# Patient Record
Sex: FEMALE | Race: BLACK OR AFRICAN AMERICAN | Employment: FULL TIME | URBAN - METROPOLITAN AREA
[De-identification: names, ages, dates, MRNs, and addresses within clinical notes are randomized per-mention and may not be internally consistent; named-entity substitution may affect disease eponyms.]

---

## 2019-04-28 ENCOUNTER — APPOINTMENT (OUTPATIENT)
Dept: GENERAL RADIOLOGY | Age: 24
End: 2019-04-28

## 2019-04-28 ENCOUNTER — HOSPITAL ENCOUNTER (EMERGENCY)
Age: 24
Discharge: HOME | End: 2019-04-28
Attending: EMERGENCY MEDICINE

## 2019-04-28 VITALS
WEIGHT: 285 LBS | DIASTOLIC BLOOD PRESSURE: 84 MMHG | RESPIRATION RATE: 17 BRPM | BODY MASS INDEX: 50.5 KG/M2 | HEART RATE: 98 BPM | TEMPERATURE: 99.1 F | HEIGHT: 63 IN | OXYGEN SATURATION: 98 % | SYSTOLIC BLOOD PRESSURE: 130 MMHG

## 2019-04-28 DIAGNOSIS — J02.9 VIRAL PHARYNGITIS: Primary | ICD-10-CM

## 2019-04-28 DIAGNOSIS — J45.21 MILD INTERMITTENT ASTHMA WITH EXACERBATION: ICD-10-CM

## 2019-04-28 PROCEDURE — 99283 EMERGENCY DEPT VISIT LOW MDM: CPT

## 2019-04-28 PROCEDURE — 96366 THER/PROPH/DIAG IV INF ADDON: CPT

## 2019-04-28 PROCEDURE — 6370000000 HC RX 637 (ALT 250 FOR IP): Performed by: EMERGENCY MEDICINE

## 2019-04-28 PROCEDURE — 2580000003 HC RX 258: Performed by: EMERGENCY MEDICINE

## 2019-04-28 PROCEDURE — 94664 DEMO&/EVAL PT USE INHALER: CPT

## 2019-04-28 PROCEDURE — 96365 THER/PROPH/DIAG IV INF INIT: CPT

## 2019-04-28 PROCEDURE — 96375 TX/PRO/DX INJ NEW DRUG ADDON: CPT

## 2019-04-28 PROCEDURE — 94640 AIRWAY INHALATION TREATMENT: CPT

## 2019-04-28 PROCEDURE — 71046 X-RAY EXAM CHEST 2 VIEWS: CPT

## 2019-04-28 PROCEDURE — 6360000002 HC RX W HCPCS: Performed by: EMERGENCY MEDICINE

## 2019-04-28 PROCEDURE — 96372 THER/PROPH/DIAG INJ SC/IM: CPT

## 2019-04-28 RX ORDER — SODIUM CHLORIDE, SODIUM LACTATE, POTASSIUM CHLORIDE, AND CALCIUM CHLORIDE .6; .31; .03; .02 G/100ML; G/100ML; G/100ML; G/100ML
1000 INJECTION, SOLUTION INTRAVENOUS ONCE
Status: COMPLETED | OUTPATIENT
Start: 2019-04-28 | End: 2019-04-28

## 2019-04-28 RX ORDER — PREDNISONE 20 MG/1
40 TABLET ORAL ONCE
Status: COMPLETED | OUTPATIENT
Start: 2019-04-28 | End: 2019-04-28

## 2019-04-28 RX ORDER — DEXAMETHASONE SODIUM PHOSPHATE 4 MG/ML
10 INJECTION, SOLUTION INTRA-ARTICULAR; INTRALESIONAL; INTRAMUSCULAR; INTRAVENOUS; SOFT TISSUE ONCE
Status: COMPLETED | OUTPATIENT
Start: 2019-04-28 | End: 2019-04-28

## 2019-04-28 RX ORDER — KETOROLAC TROMETHAMINE 30 MG/ML
15 INJECTION, SOLUTION INTRAMUSCULAR; INTRAVENOUS ONCE
Status: COMPLETED | OUTPATIENT
Start: 2019-04-28 | End: 2019-04-28

## 2019-04-28 RX ORDER — IPRATROPIUM BROMIDE AND ALBUTEROL SULFATE 2.5; .5 MG/3ML; MG/3ML
1 SOLUTION RESPIRATORY (INHALATION) ONCE
Status: COMPLETED | OUTPATIENT
Start: 2019-04-28 | End: 2019-04-28

## 2019-04-28 RX ORDER — ALBUTEROL SULFATE 90 UG/1
2 AEROSOL, METERED RESPIRATORY (INHALATION) 4 TIMES DAILY PRN
Qty: 1 INHALER | Refills: 0 | Status: SHIPPED | OUTPATIENT
Start: 2019-04-28 | End: 2020-07-07

## 2019-04-28 RX ORDER — ONDANSETRON 2 MG/ML
8 INJECTION INTRAMUSCULAR; INTRAVENOUS ONCE
Status: COMPLETED | OUTPATIENT
Start: 2019-04-28 | End: 2019-04-28

## 2019-04-28 RX ORDER — IBUPROFEN 600 MG/1
600 TABLET ORAL EVERY 6 HOURS PRN
Qty: 30 TABLET | Refills: 0 | Status: SHIPPED | OUTPATIENT
Start: 2019-04-28 | End: 2020-07-07

## 2019-04-28 RX ORDER — PREDNISONE 10 MG/1
TABLET ORAL
Qty: 16 TABLET | Refills: 0 | Status: SHIPPED | OUTPATIENT
Start: 2019-04-28 | End: 2019-05-08

## 2019-04-28 RX ADMIN — DEXAMETHASONE SODIUM PHOSPHATE 10 MG: 4 INJECTION, SOLUTION INTRAMUSCULAR; INTRAVENOUS at 16:53

## 2019-04-28 RX ADMIN — ONDANSETRON 8 MG: 2 INJECTION INTRAMUSCULAR; INTRAVENOUS at 16:52

## 2019-04-28 RX ADMIN — IPRATROPIUM BROMIDE AND ALBUTEROL SULFATE 1 AMPULE: .5; 3 SOLUTION RESPIRATORY (INHALATION) at 16:33

## 2019-04-28 RX ADMIN — KETOROLAC TROMETHAMINE 15 MG: 30 INJECTION, SOLUTION INTRAMUSCULAR at 16:53

## 2019-04-28 RX ADMIN — PREDNISONE 40 MG: 20 TABLET ORAL at 18:31

## 2019-04-28 RX ADMIN — SODIUM CHLORIDE, POTASSIUM CHLORIDE, SODIUM LACTATE AND CALCIUM CHLORIDE 1000 ML: 600; 310; 30; 20 INJECTION, SOLUTION INTRAVENOUS at 16:52

## 2019-04-28 ASSESSMENT — PAIN SCALES - GENERAL
PAINLEVEL_OUTOF10: 7
PAINLEVEL_OUTOF10: 6

## 2019-04-28 ASSESSMENT — PAIN DESCRIPTION - LOCATION: LOCATION: THROAT

## 2019-04-28 ASSESSMENT — PAIN DESCRIPTION - PAIN TYPE: TYPE: ACUTE PAIN

## 2019-04-28 NOTE — ED PROVIDER NOTES
[address] ENCOUNTER          ATTENDING PHYSICIAN NOTE       Date of evaluation: 4/28/2019    Chief Complaint     Cough and Pharyngitis      History of Present Illness     Geronimo Bo is a 26 y.o. female who presents with 2 days of sore throat, cough. Cough is productive of green sputum. She reports associated sinus congestion. Has had one episode of post tussive emesis this morning. Remains nauseated. No diarrhea. Has had subjective fever. No abdominal pain. Has used Afrin without relief. Has not taken anything else for symptoms. Reports that she has asthma but has not had an albuterol inhaler for some time because she never has problems with her asthma. Reports some mild wheezing but no chest pain. Think she is dehydrated because she has not been able to eat or drink much the last few days. No other aggravating or relieving factors or associated symptoms. Review of Systems     Positive for fever, vomiting, shortness of breath. Negative for chest pain, abdominal pain. All other systems were reviewed and are negative, except as mentioned in HPI. Past Medical, Surgical, Family, and Social History     She has a past medical history of Asthma and Bronchitis. She has no past surgical history on file. Her family history is not on file. She reports that she has quit smoking. She has never used smokeless tobacco. She reports that she does not drink alcohol or use drugs. Medications     Previous Medications    No medications on file       Allergies     She has No Known Allergies. Physical Exam     INITIAL VITALS: BP: 130/84, Temp: 99.1 °F (37.3 °C), Pulse: 98, Resp: 17, SpO2: 99 %       General:  Well appearing. No acute distress. Eyes:  Pupils reactive. No discharge from eyes. ENT:  No discharge from nose. OP with tacky mucous membranes and mildly enlarged erythematous tonsils symmetric. Neck:  Supple.   Tender cervical

## 2019-04-28 NOTE — LETTER
[facility] Emergency Department  [address] [place] [address]  Phone: [phone]  Fax: [phone]             April 28, 2019    Patient: Dore Hodgkins   YOB: 1995   Date of Visit: 4/28/2019       To Whom It May Concern:    Dore Hodgkins was seen and treated in our emergency department on 4/28/2019. Please excuse her 4/28/2019 and 4/29/2019.       Sincerely,             Signature:__________________________________

## 2019-04-28 NOTE — ED TRIAGE NOTES
Pt states she's had a sore throat and congestion since Friday. States she has a lot of mucous and has been coughing a lot due to this. States she has coughed to the point of vomiting and has felt chills and intermittent sweating. STates she's been attempting tea without success.

## 2020-03-12 ENCOUNTER — HOSPITAL ENCOUNTER (EMERGENCY)
Age: 25
Discharge: HOME | End: 2020-03-12
Payer: COMMERCIAL

## 2020-03-12 VITALS
HEART RATE: 78 BPM | OXYGEN SATURATION: 98 % | HEIGHT: 63 IN | WEIGHT: 260.36 LBS | SYSTOLIC BLOOD PRESSURE: 154 MMHG | TEMPERATURE: 97.3 F | DIASTOLIC BLOOD PRESSURE: 100 MMHG | BODY MASS INDEX: 46.13 KG/M2 | RESPIRATION RATE: 20 BRPM

## 2020-03-12 PROCEDURE — 6360000002 HC RX W HCPCS: Performed by: PHYSICIAN ASSISTANT

## 2020-03-12 PROCEDURE — 99282 EMERGENCY DEPT VISIT SF MDM: CPT

## 2020-03-12 RX ORDER — DEXAMETHASONE 4 MG/1
8 TABLET ORAL ONCE
Status: COMPLETED | OUTPATIENT
Start: 2020-03-12 | End: 2020-03-12

## 2020-03-12 RX ORDER — AZITHROMYCIN 250 MG/1
TABLET, FILM COATED ORAL
Qty: 1 PACKET | Refills: 0 | Status: SHIPPED | OUTPATIENT
Start: 2020-03-12 | End: 2020-07-07

## 2020-03-12 RX ADMIN — DEXAMETHASONE 8 MG: 4 TABLET ORAL at 02:43

## 2020-03-12 ASSESSMENT — ENCOUNTER SYMPTOMS
VOMITING: 0
VOICE CHANGE: 0
TROUBLE SWALLOWING: 0
SORE THROAT: 1
SHORTNESS OF BREATH: 0

## 2020-03-12 NOTE — ED PROVIDER NOTES
[address] [facility]      Pt Name: Anusha Dejesus  MRN: [id number]  [place] 1995  Date of evaluation: 3/12/2020  Provider: JESUS Harrison    This patient was not seen and evaluated by the attending physician No att. providers found. CHIEF COMPLAINT       Chief Complaint   Patient presents with    Pharyngitis     Patient has sore throat. Patient has been seen at [facility] [place] for issue. Results negative. CRITICAL CARE TIME   I performed a total Critical Care time of  15 minutes, excluding separately reportable procedures. There was a high probability of clinically significant/life threatening deterioration in the patient's condition which required my urgent intervention. Not limited to multiple reexaminations, discussions with attending physician and consultants. HISTORY OF PRESENT ILLNESS  (Location/Symptom, Timing/Onset, Context/Setting, Quality, Duration, Modifying Factors, Severity.)   Anusha Dejesus is a 27 y.o. female who presents to the emergency department accompanied by her dad. She is had a sore throat for about a month. Was seen a couple weeks ago at a hospital and tested negative for strep. She is tried some cough drops and syrup with minimal relief. No drooling or change in voice. Denies chance of pregnancy or known chronic medical problems. Nursing Notes were reviewed and I agree. REVIEW OF SYSTEMS    (2-9 systems for level 4, 10 or more for level 5)     Review of Systems   Constitutional: Negative for fever. HENT: Positive for sore throat. Negative for drooling, trouble swallowing and voice change. Respiratory: Negative for shortness of breath. Cardiovascular: Negative for chest pain. Gastrointestinal: Negative for vomiting. Musculoskeletal: Negative for neck pain and neck stiffness. Skin: Negative for rash and wound. Neurological: Negative for weakness and numbness.    Psychiatric/Behavioral: Negative for agitation and behavioral problems. Except as noted above the remainder of the review of systems was reviewed and negative. PAST MEDICAL HISTORY         Diagnosis Date    Asthma     Bronchitis        SURGICAL HISTORY     History reviewed. No pertinent surgical history. CURRENT MEDICATIONS       Discharge Medication List as of 3/12/2020  2:50 AM      CONTINUE these medications which have NOT CHANGED    Details   albuterol sulfate  (90 Base) MCG/ACT inhaler Inhale 2 puffs into the lungs 4 times daily as needed for Wheezing, Disp-1 Inhaler, R-0Print      ibuprofen (ADVIL;MOTRIN) 600 MG tablet Take 1 tablet by mouth every 6 hours as needed for Pain, Disp-30 tablet, R-0Print             ALLERGIES     Patient has no known allergies. FAMILY HISTORY     History reviewed. No pertinent family history. No family status information on file. SOCIAL HISTORY      reports that she has quit smoking. She has never used smokeless tobacco. She reports current drug use. Drug: Marijuana. She reports that she does not drink alcohol. PHYSICAL EXAM    (up to 7 for level 4, 8 or more for level 5)     ED Triage Vitals [03/12/20 0225]   BP Temp Temp Source Pulse Resp SpO2 Height Weight   (!) 154/100 97.3 °F (36.3 °C) Oral 78 20 98 % 5' 3\" (1.6 m) 260 lb 5.8 oz (118.1 kg)       Physical Exam  Vitals signs and nursing note reviewed. Constitutional:       Appearance: Normal appearance. HENT:      Head: Normocephalic and atraumatic. Mouth/Throat:      Mouth: Mucous membranes are moist.      Pharynx: Oropharyngeal exudate and posterior oropharyngeal erythema present. Neck:      Musculoskeletal: Normal range of motion. Cardiovascular:      Rate and Rhythm: Normal rate. Pulses: Normal pulses. Pulmonary:      Effort: Pulmonary effort is normal. No respiratory distress. Musculoskeletal: Normal range of motion. Skin:     General: Skin is warm.    Neurological:      General: No focal deficit present. Mental Status: She is alert and oriented to person, place, and time. Psychiatric:         Mood and Affect: Mood normal.         Behavior: Behavior normal.         DIAGNOSTIC RESULTS     NONE    LABS:  Labs Reviewed - No data to display    All other labs were within normal range or not returned as of this dictation. EMERGENCY DEPARTMENT COURSE and DIFFERENTIAL DIAGNOSIS/MDM:   Vitals:    Vitals:    03/12/20 0225   BP: (!) 154/100   Pulse: 78   Resp: 20   Temp: 97.3 °F (36.3 °C)   TempSrc: Oral   SpO2: 98%   Weight: 260 lb 5.8 oz (118.1 kg)   Height: 5' 3\" (1.6 m)     I discussed with Carly Hernández and/or family the exam results, diagnosis, care, prognosis, reasons to return and the importance of follow up. Patient and/or family is in full agreement with plan and all questions have been answered. Specific discharge instructions explained, including reasons to return to the emergency department. Carly Hernández is well appearing, non-toxic, and afebrile at the time of discharge. Patient has an exudative pharyngitis. No unilateral swelling, drooling or change in voice. Afebrile not tachycardic. Not hypoxic. Symptoms have been going on for a month which prompted her to come here. She tested negative for strep however with her exudative pharyngitis and persistent symptoms for a month we will cover with antibiotic. Also given a dose of steroid. Her blood pressure is elevated her dad has history of hypertension. Stressed importance of follow-up with primary care to have recheck to return for new, worsening or other concerns. I estimate there is LOW risk for PNEUMONIA, MENINGITIS, PERITONSILLAR ABSCESS, SEPSIS, MALIGNANT OTITIS EXTERNA, OR EPIGLOTTITIS thus I consider the discharge disposition reasonable. CONSULTS:  None    PROCEDURES:  Procedures      FINAL IMPRESSION      1. Exudative pharyngitis    2.  Elevated BP without diagnosis of hypertension          DISPOSITION/PLAN DISPOSITION Decision To Discharge 03/12/2020 02:28:42 AM      PATIENT REFERRED TO:  [facility] [place] ([facility] Pre-Services  [phone]          DISCHARGE MEDICATIONS:  Discharge Medication List as of 3/12/2020  2:50 AM      START taking these medications    Details   azithromycin (ZITHROMAX) 250 MG tablet 2 po day 1, then 1 po days 2-5, Disp-1 packet, R-0Print             (Please note that portions of this note were completed with a voice recognition program.  Efforts were made to edit the dictations but occasionally words are mis-transcribed.)    Leatha Fernandez, [address] [place], [place]  03/12/20 [id number]

## 2020-03-12 NOTE — ED NOTES
Discharge and education instructions reviewed. Patient verbalized understanding, teach-back successful. Patient denied questions at this time. No acute distress noted. Patient instructed to follow-up as noted - return to emergency department if symptoms worsen. Patient verbalized understanding. Discharged per EDMD with discharged instructions.        Migel Baugh RN  03/12/20 [id number]

## 2020-03-12 NOTE — LETTER
[facility] Emergency Department  [address] [facility] [address]  Phone: [phone]               March 15, 2020    Patient: Jordan Suarez   YOB: 1995   Date of Visit: 3/12/2020       To Whom It May Concern:    Jordan Suarez was seen and treated in our emergency department on 3/12/2020. She may return to work on 3/15/2020.       Sincerely,       Will Silverman RN         Signature:__________________________________

## 2020-04-05 ENCOUNTER — HOSPITAL ENCOUNTER (EMERGENCY)
Age: 25
Discharge: HOME | End: 2020-04-05
Payer: COMMERCIAL

## 2020-04-05 VITALS
HEART RATE: 72 BPM | BODY MASS INDEX: 47.07 KG/M2 | OXYGEN SATURATION: 99 % | WEIGHT: 265.65 LBS | SYSTOLIC BLOOD PRESSURE: 121 MMHG | TEMPERATURE: 98 F | DIASTOLIC BLOOD PRESSURE: 71 MMHG | HEIGHT: 63 IN | RESPIRATION RATE: 18 BRPM

## 2020-04-05 PROCEDURE — 6370000000 HC RX 637 (ALT 250 FOR IP): Performed by: NURSE PRACTITIONER

## 2020-04-05 PROCEDURE — 99282 EMERGENCY DEPT VISIT SF MDM: CPT

## 2020-04-05 RX ORDER — PREDNISONE 20 MG/1
60 TABLET ORAL ONCE
Status: COMPLETED | OUTPATIENT
Start: 2020-04-05 | End: 2020-04-05

## 2020-04-05 RX ORDER — PREDNISONE 10 MG/1
60 TABLET ORAL DAILY
Qty: 30 TABLET | Refills: 0 | Status: SHIPPED | OUTPATIENT
Start: 2020-04-05 | End: 2020-04-10

## 2020-04-05 RX ORDER — DIPHENHYDRAMINE HCL 25 MG
25 TABLET ORAL ONCE
Status: COMPLETED | OUTPATIENT
Start: 2020-04-05 | End: 2020-04-05

## 2020-04-05 RX ORDER — DIPHENHYDRAMINE HCL 25 MG
25 CAPSULE ORAL EVERY 4 HOURS PRN
Qty: 25 CAPSULE | Refills: 0 | Status: SHIPPED | OUTPATIENT
Start: 2020-04-05 | End: 2020-04-15

## 2020-04-05 RX ORDER — FAMOTIDINE 20 MG/1
20 TABLET, FILM COATED ORAL ONCE
Status: COMPLETED | OUTPATIENT
Start: 2020-04-05 | End: 2020-04-05

## 2020-04-05 RX ORDER — FAMOTIDINE 20 MG/1
20 TABLET, FILM COATED ORAL 2 TIMES DAILY
Qty: 60 TABLET | Refills: 0 | Status: SHIPPED | OUTPATIENT
Start: 2020-04-05 | End: 2020-07-07

## 2020-04-05 RX ADMIN — FAMOTIDINE 20 MG: 20 TABLET, FILM COATED ORAL at 20:55

## 2020-04-05 RX ADMIN — DIPHENHYDRAMINE HCL 25 MG: 25 TABLET ORAL at 20:55

## 2020-04-05 RX ADMIN — PREDNISONE 60 MG: 20 TABLET ORAL at 20:55

## 2020-04-05 ASSESSMENT — PAIN SCALES - GENERAL
PAINLEVEL_OUTOF10: 0

## 2020-04-05 NOTE — LETTER
[facility] Emergency Department  [address] [facility] [address]  Phone: [phone]               April 7, 2020    Patient: Willian Buenrostro   YOB: 1995   Date of Visit: 4/5/2020       To Whom It May Concern:    Willian Buenrostro was seen and treated in our emergency department on 4/5/2020. She may return to work on 4/7/2020.       Sincerely,               Signature:__________________________________

## 2020-04-06 NOTE — ED PROVIDER NOTES
be negative    PHYSICAL EXAM  /71   Pulse 72   Temp 98 °F (36.7 °C) (Oral)   Resp 18   Ht 5' 3\" (1.6 m)   Wt 265 lb 10.5 oz (120.5 kg)   SpO2 99%   BMI 47.06 kg/m²   GENERAL APPEARANCE: Awake and alert. Cooperative. No acute distress. Vital signs are stable. Afebrile. HEAD: Normocephalic. Atraumatic. EYES: PERRL. EOM's grossly intact. ENT: Mucous membranes are moist.   NECK: Supple. Normal ROM. CHEST: Equal symmetric chest rise. LUNGS: Breathing is unlabored. Speaking comfortably in full sentences. No wheezing, rhonchi, rales, stridor. Abdomen: Nondistended. Soft and nontender. EXTREMITIES: MAEE. No acute deformities. SKIN: Warm and dry. No rash. No acute care. NEUROLOGICAL: Alert and oriented. Strength is 5/5 in all extremities and sensation is intact. ED COURSE/MDM  Patient seen and evaluated. Old records reviewed. Diagnostic testing reviewed and results discussed. I have independently evaluated this patient based upon my scope of practice. Supervising physician was in the department as needed for consultation. Willian Buenrostro presented to the ED today with above noted complaints. She monitored in the emergency department in no acute distress. Patient was given Benadryl, Pepcid, prednisone. Patient directed to return to the emergency department immediately with new or worsening symptoms. Patient also instructed to follow-up with the hazmat team and if they have any additional instructions for her after they get the test results back from the unknown substance to follow their instructions. Patient verbalized understanding.       While in ED patient received   Medications   diphenhydrAMINE (BENADRYL) tablet 25 mg (25 mg Oral Given 4/5/20 2055)   famotidine (PEPCID) tablet 20 mg (20 mg Oral Given 4/5/20 2055)   predniSONE (DELTASONE) tablet 60 mg (60 mg Oral Given 4/5/20 2055)       At this point I do not feel the patient requires further work up and it is reasonable to of these medication. Discharge Medication List as of 4/5/2020 10:02 PM      START taking these medications    Details   diphenhydrAMINE (BENADRYL) 25 MG capsule Take 1 capsule by mouth every 4 hours as needed for Itching, Disp-25 capsule, R-0Print      predniSONE (DELTASONE) 10 MG tablet Take 6 tablets by mouth daily for 5 doses, Disp-30 tablet, R-0Print      famotidine (PEPCID) 20 MG tablet Take 1 tablet by mouth 2 times daily, Disp-60 tablet, R-0Print               FOLLOW UP  [facility]) Pre-Services  [phone]  Schedule an appointment as soon as possible for a visit   follow up    [address] [facility] Emergency Department  [address] [facility]  [phone]  Go to   As needed, If symptoms worsen      DISPOSITION  Patient was discharged to home in good condition. Comment: Please note this report has been produced using speech recognition software and may contain errors related to that system including errors in grammar, punctuation, and spelling, as well as words and phrases that may be inappropriate. If there are any questions or concerns please feel free to contact the dictating provider for clarification.         ASHOK Crow - CNP  04/06/20 8513

## 2020-06-14 ENCOUNTER — HOSPITAL ENCOUNTER (EMERGENCY)
Age: 25
Discharge: HOME | End: 2020-06-14
Attending: EMERGENCY MEDICINE
Payer: COMMERCIAL

## 2020-06-14 ENCOUNTER — APPOINTMENT (OUTPATIENT)
Dept: CT IMAGING | Age: 25
End: 2020-06-14
Payer: COMMERCIAL

## 2020-06-14 ENCOUNTER — APPOINTMENT (OUTPATIENT)
Dept: GENERAL RADIOLOGY | Age: 25
End: 2020-06-14
Payer: COMMERCIAL

## 2020-06-14 VITALS
SYSTOLIC BLOOD PRESSURE: 158 MMHG | TEMPERATURE: 98.1 F | HEIGHT: 62 IN | WEIGHT: 268 LBS | OXYGEN SATURATION: 100 % | HEART RATE: 75 BPM | RESPIRATION RATE: 17 BRPM | BODY MASS INDEX: 49.32 KG/M2 | DIASTOLIC BLOOD PRESSURE: 96 MMHG

## 2020-06-14 PROCEDURE — 70450 CT HEAD/BRAIN W/O DYE: CPT

## 2020-06-14 PROCEDURE — 6360000002 HC RX W HCPCS: Performed by: EMERGENCY MEDICINE

## 2020-06-14 PROCEDURE — 96372 THER/PROPH/DIAG INJ SC/IM: CPT

## 2020-06-14 PROCEDURE — 73630 X-RAY EXAM OF FOOT: CPT

## 2020-06-14 PROCEDURE — 99284 EMERGENCY DEPT VISIT MOD MDM: CPT

## 2020-06-14 PROCEDURE — 6370000000 HC RX 637 (ALT 250 FOR IP): Performed by: EMERGENCY MEDICINE

## 2020-06-14 RX ORDER — ACETAMINOPHEN 500 MG
1000 TABLET ORAL ONCE
Status: COMPLETED | OUTPATIENT
Start: 2020-06-14 | End: 2020-06-14

## 2020-06-14 RX ORDER — KETOROLAC TROMETHAMINE 30 MG/ML
30 INJECTION, SOLUTION INTRAMUSCULAR; INTRAVENOUS ONCE
Status: COMPLETED | OUTPATIENT
Start: 2020-06-14 | End: 2020-06-14

## 2020-06-14 RX ADMIN — KETOROLAC TROMETHAMINE 30 MG: 30 INJECTION, SOLUTION INTRAMUSCULAR; INTRAVENOUS at 14:33

## 2020-06-14 RX ADMIN — ACETAMINOPHEN 1000 MG: 500 TABLET, COATED ORAL at 14:09

## 2020-06-14 ASSESSMENT — PAIN SCALES - GENERAL
PAINLEVEL_OUTOF10: 2
PAINLEVEL_OUTOF10: 2

## 2020-06-14 NOTE — ED PROVIDER NOTES
[address] ENCOUNTER          ATTENDING PHYSICIAN NOTE       Date of evaluation: 6/14/2020    Chief Complaint     Headache and Foot Swelling      History of Present Illness     Judah Mahmood is a 27 y.o. female who presents presenting today with headaches and left foot swelling. The patient reports that she has had several months of intermittent headaches. She states that she works at [employer] and these headaches get worse while she is at work. She reports that they are located in the frontal region. Occasional photophobia. No phonophobia. No nausea or vomiting. No neurologic deficits with the onset of the symptoms. No history of head trauma. These headaches are gradual in onset, lasts approximately 10 to 15 minutes, and then completely resolved on their own. She occasionally takes Tylenol and Excedrin Migraine. She does not have a primary care physician. She also reports some intermittent left foot swelling. Unknown trauma. No calf pain or tenderness. No overlying skin changes or deformity. No numbness or tingling. Review of Systems     Review of Systems  10 point review of systems is negative other than those mentioned above in the HPI    Past Medical, Surgical, Family, and Social History     She has a past medical history of Asthma and Bronchitis. She has no past surgical history on file. Her family history is not on file. She reports that she has quit smoking. She has never used smokeless tobacco. She reports current alcohol use. She reports current drug use. Drug: Marijuana.     Medications     Previous Medications    ALBUTEROL SULFATE  (90 BASE) MCG/ACT INHALER    Inhale 2 puffs into the lungs 4 times daily as needed for Wheezing    AZITHROMYCIN (ZITHROMAX) 250 MG TABLET    2 po day 1, then 1 po days 2-5    FAMOTIDINE (PEPCID) 20 MG TABLET    Take 1 tablet by mouth 2 times daily    IBUPROFEN (ADVIL;MOTRIN) 600 MG TABLET    Take 1 tablet by mouth every 6 medications on file       DISPOSITION  06/14/2020 01:49:36 PM       Bridger Magallon MD  06/14/20 [id number]

## 2020-06-14 NOTE — ED NOTES
Patient prepared for and ready to be discharged. Patient discharged at this time in no acute distress after verbalizing understanding of discharge instructions. Patient left after receiving After Visit Summary instructions.       Syl Mercedes RN  06/14/20 [id number]

## 2020-06-15 ENCOUNTER — TELEPHONE (OUTPATIENT)
Dept: ADMINISTRATIVE | Age: 25
End: 2020-06-15

## 2020-07-07 ENCOUNTER — HOSPITAL ENCOUNTER (OUTPATIENT)
Dept: GENERAL RADIOLOGY | Age: 25
Discharge: HOME | End: 2020-07-07
Payer: COMMERCIAL

## 2020-07-07 ENCOUNTER — HOSPITAL ENCOUNTER (OUTPATIENT)
Age: 25
Discharge: HOME | End: 2020-07-07
Payer: COMMERCIAL

## 2020-07-07 ENCOUNTER — OFFICE VISIT (OUTPATIENT)
Dept: FAMILY MEDICINE CLINIC | Age: 25
End: 2020-07-07
Payer: COMMERCIAL

## 2020-07-07 VITALS
SYSTOLIC BLOOD PRESSURE: 132 MMHG | HEART RATE: 78 BPM | RESPIRATION RATE: 12 BRPM | DIASTOLIC BLOOD PRESSURE: 78 MMHG | WEIGHT: 268.2 LBS | BODY MASS INDEX: 49.05 KG/M2

## 2020-07-07 DIAGNOSIS — E66.01 CLASS 3 SEVERE OBESITY DUE TO EXCESS CALORIES WITH BODY MASS INDEX (BMI) OF 45.0 TO 49.9 IN ADULT, UNSPECIFIED WHETHER SERIOUS COMORBIDITY PRESENT (HCC): ICD-10-CM

## 2020-07-07 DIAGNOSIS — N91.2 AMENORRHEA: ICD-10-CM

## 2020-07-07 PROBLEM — J45.20 MILD INTERMITTENT ASTHMA: Status: ACTIVE | Noted: 2020-07-07

## 2020-07-07 PROBLEM — G43.C0 PERIODIC HEADACHE SYNDROME, NOT INTRACTABLE: Status: ACTIVE | Noted: 2020-07-07

## 2020-07-07 PROBLEM — M79.674 PAIN OF RIGHT GREAT TOE: Status: ACTIVE | Noted: 2020-07-07

## 2020-07-07 PROBLEM — Z23 NEED FOR TDAP VACCINATION: Status: ACTIVE | Noted: 2020-07-07

## 2020-07-07 PROCEDURE — 99204 OFFICE O/P NEW MOD 45 MIN: CPT | Performed by: NURSE PRACTITIONER

## 2020-07-07 PROCEDURE — 73660 X-RAY EXAM OF TOE(S): CPT

## 2020-07-07 PROCEDURE — 90471 IMMUNIZATION ADMIN: CPT | Performed by: NURSE PRACTITIONER

## 2020-07-07 PROCEDURE — G8427 DOCREV CUR MEDS BY ELIG CLIN: HCPCS | Performed by: NURSE PRACTITIONER

## 2020-07-07 PROCEDURE — 90715 TDAP VACCINE 7 YRS/> IM: CPT | Performed by: NURSE PRACTITIONER

## 2020-07-07 PROCEDURE — 1036F TOBACCO NON-USER: CPT | Performed by: NURSE PRACTITIONER

## 2020-07-07 PROCEDURE — G8417 CALC BMI ABV UP PARAM F/U: HCPCS | Performed by: NURSE PRACTITIONER

## 2020-07-07 RX ORDER — ALBUTEROL SULFATE 90 UG/1
2 AEROSOL, METERED RESPIRATORY (INHALATION) 4 TIMES DAILY PRN
Qty: 1 INHALER | Refills: 0 | Status: SHIPPED | OUTPATIENT
Start: 2020-07-07 | End: 2021-10-19 | Stop reason: ALTCHOICE

## 2020-07-07 ASSESSMENT — ENCOUNTER SYMPTOMS
EYE REDNESS: 0
CHEST TIGHTNESS: 0
RHINORRHEA: 0
EYE PAIN: 0
SINUS PAIN: 0
SINUS PRESSURE: 0
WHEEZING: 0
ABDOMINAL PAIN: 0
BACK PAIN: 0
SHORTNESS OF BREATH: 0
STRIDOR: 0
DIARRHEA: 0
COUGH: 0
VOMITING: 0
ABDOMINAL DISTENTION: 0
TROUBLE SWALLOWING: 0
NAUSEA: 0
CONSTIPATION: 0
EYE ITCHING: 0
EYE DISCHARGE: 0

## 2020-07-07 ASSESSMENT — PATIENT HEALTH QUESTIONNAIRE - PHQ9
SUM OF ALL RESPONSES TO PHQ9 QUESTIONS 1 & 2: 0
1. LITTLE INTEREST OR PLEASURE IN DOING THINGS: 0
SUM OF ALL RESPONSES TO PHQ QUESTIONS 1-9: 0
2. FEELING DOWN, DEPRESSED OR HOPELESS: 0
SUM OF ALL RESPONSES TO PHQ QUESTIONS 1-9: 0

## 2020-07-07 NOTE — ASSESSMENT & PLAN NOTE
No fracture noted on x-ray likely a tendon strain when she stubbed it at work. Recommended ice and rest continue to wear hard soled shoes at work this should resolve over the next few weeks.

## 2020-07-07 NOTE — PROGRESS NOTES
2020    Gina Roman (:  1995) is a 23 y.o. female, here to establish care or for evaluation of the following medical concerns:    Patient presents to establish care. She has not seen a doctor with any regularity since her pediatricians. Chief complaint today is headaches. She states it started about 3 months ago during the lockdown and it got significantly worse while she has been at work. 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 She states the headaches come 1-2 times a week and last anywhere from 15 minutes to the entire shift. She states they feel like they are pounding. She takes ibuprofen and Excedrin with some relief at times. She states some of these headaches only go away when she goes to sleep and she wakes up and they are gone. She has mild phonophobia but no photophobia and no nausea with them she denies any neurologic deficit while she has these headaches. 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 She states she feels like she stays well-hydrated but does not get much rest.  Additionally she complains of pain in her right great toe. States that she broke this a number of years ago and tripped while she was at work a couple weeks ago and the toe remains painful. She has not had any imaging done of this. She does wear steel toed shoes while she is at work. She reports amenorrhea has not had a menstrual cycle for 6 months. She is in a same-sex relationship and has no concerns for pregnancy at this time. She is only mildly concerned about the menstrual irregularity as she may want to have children at some point. Review of Systems   Constitutional: Negative for chills, fatigue and fever. HENT: Negative for congestion, ear pain, hearing loss, rhinorrhea, sinus pressure, sinus pain, tinnitus and trouble swallowing. Eyes: Negative for pain, discharge, redness and itching.    Respiratory: Negative for cough, chest tightness, shortness of breath, wheezing and stridor. Cardiovascular: Negative for chest pain, palpitations and leg swelling. Gastrointestinal: Negative for abdominal distention, abdominal pain, constipation, diarrhea, nausea and vomiting. Genitourinary: Negative for difficulty urinating, dysuria, hematuria and urgency. Musculoskeletal: Positive for arthralgias. Negative for back pain, joint swelling, myalgias and neck pain. Right great toe   Skin: Negative for rash and wound. Neurological: Positive for headaches. Negative for dizziness. Current Outpatient Medications   Medication Sig Dispense Refill    albuterol sulfate HFA (VENTOLIN HFA) 108 (90 Base) MCG/ACT inhaler Inhale 2 puffs into the lungs 4 times daily as needed for Wheezing 1 Inhaler 0     No current facility-administered medications for this visit. No Known Allergies    Past Medical History:   Diagnosis Date    Asthma     Bronchitis        No past surgical history on file.     Social History     Socioeconomic History    Marital status: Single     Spouse name: Not on file    Number of children: Not on file    Years of education: Not on file    Highest education level: Not on file   Occupational History    Not on file   Social Needs    Financial resource strain: Not on file    Food insecurity     Worry: Not on file     Inability: Not on file    Transportation needs     Medical: Not on file     Non-medical: Not on file   Tobacco Use    Smoking status: Former Smoker    Smokeless tobacco: Never Used   Substance and Sexual Activity    Alcohol use: Yes     Comment: occ    Drug use: Yes     Types: Marijuana    Sexual activity: Yes   Lifestyle    Physical activity     Days per week: Not on file     Minutes per session: Not on file    Stress: Not on file   Relationships    Social connections     Talks on phone: Not on file     Gets together: Not on file     Attends Druze service: Not on file Active member of club or organization: Not on file     Attends meetings of clubs or organizations: Not on file     Relationship status: Not on file    Intimate partner violence     Fear of current or ex partner: Not on file     Emotionally abused: Not on file     Physically abused: Not on file     Forced sexual activity: Not on file   Other Topics Concern    Not on file   Social History Narrative    Not on file        No family history on file. Vitals:    07/07/20 0904   BP: 132/78   Pulse: 78   Resp: 12       Estimated body mass index is 49.05 kg/m² as calculated from the following:    Height as of 6/14/20: 5' 2\" (1.575 m). Weight as of this encounter: 268 lb 3.2 oz (121.7 kg). Physical Exam  Vitals signs reviewed. Constitutional:       Appearance: She is well-developed. HENT:      Head: Normocephalic and atraumatic. Right Ear: External ear normal.      Left Ear: External ear normal.      Nose: Nose normal.   Eyes:      General: No scleral icterus. Right eye: No discharge. Left eye: No discharge. Conjunctiva/sclera: Conjunctivae normal.      Pupils: Pupils are equal, round, and reactive to light. Neck:      Musculoskeletal: Normal range of motion and neck supple. Thyroid: No thyromegaly. Cardiovascular:      Rate and Rhythm: Normal rate and regular rhythm. Heart sounds: Normal heart sounds. No murmur. No friction rub. No gallop. Pulmonary:      Effort: Pulmonary effort is normal. No respiratory distress. Breath sounds: Normal breath sounds. No stridor. No wheezing or rales. Chest:      Chest wall: No tenderness. Abdominal:      General: Bowel sounds are normal. There is no distension. Palpations: Abdomen is soft. There is no mass. Tenderness: There is no abdominal tenderness. There is no guarding or rebound. Hernia: No hernia is present. Musculoskeletal: Normal range of motion. General: No tenderness or deformity. Lymphadenopathy:      Cervical: No cervical adenopathy. Skin:     General: Skin is warm and dry. Capillary Refill: Capillary refill takes less than 2 seconds. Coloration: Skin is not pale. Findings: No erythema or rash. Neurological:      Mental Status: She is alert and oriented to person, place, and time. Cranial Nerves: No cranial nerve deficit. Motor: No abnormal muscle tone. Coordination: Coordination normal.   Psychiatric:         Behavior: Behavior normal.         Thought Content: Thought content normal.         Judgment: Judgment normal.         ASSESSMENT/PLAN:  Health Maintenance   Topic Date Due    HIV screen  07/01/2012    Chlamydia screen  07/01/2013    Cervical cancer screen  07/01/2018    Flu vaccine (1) 09/01/2020    DTaP/Tdap/Td vaccine (8 - Td) 07/07/2030    Hepatitis B vaccine  Completed    Hib vaccine  Completed    HPV vaccine  Completed    Varicella vaccine  Completed    Meningococcal (ACWY) vaccine  Completed    Hepatitis A vaccine  Aged Out    Pneumococcal 0-64 years Vaccine  Aged Out        Diagnosis Orders   1. Periodic headache syndrome, not intractable     2. Class 3 severe obesity due to excess calories with body mass index (BMI) of 45.0 to 49.9 in adult, unspecified whether serious comorbidity present (HCC)  COMPREHENSIVE METABOLIC PANEL    HEMOGLOBIN A1C    CBC WITH AUTO DIFFERENTIAL    TSH WITH REFLEX TO FT4   3. Mild intermittent asthma, unspecified whether complicated  albuterol sulfate HFA (VENTOLIN HFA) 108 (90 Base) MCG/ACT inhaler   4. Pain of right great toe  XR TOE RIGHT (MIN 2 VIEWS)   5. Amenorrhea  FOLLICLE STIMULATING HORMONE    PROLACTIN   6. Need for Tdap vaccination  Tdap (age 6y and older) IM (BOOSTRIX)     Mild intermittent asthma  Generally well controlled refill provided of albuterol inhaler for rescue as needed    Amenorrhea  Labs today.     Class 3 severe obesity due to excess calories with body mass index (BMI) of 45.0 to 49.9 in adult [facility])  Patient aware of need to lose weight. This may be contributing to her lack of menstrual cycle. Pain of right great toe  No fracture noted on x-ray likely a tendon strain when she stubbed it at work. Recommended ice and rest continue to wear hard soled shoes at work this should resolve over the next few weeks. Periodic headache syndrome, not intractable  Presents the stress versus dehydration headaches rather than chronic migraine. Recommend she take ibuprofen with a large glass of water and some food before reporting to work and drink regularly through the evening while she is working. CT done in the emergency room was negative for mass. Return in about 4 weeks (around 8/4/2020). An  electronic signature was used to authenticate this note.   --Brandi Alcaraz on 7/7/2020 at 11:28 AM

## 2020-07-08 LAB
A/G RATIO: 1.6 (ref 1.1–2.2)
ALBUMIN SERPL-MCNC: 4.6 G/DL (ref 3.4–5)
ALP BLD-CCNC: 63 U/L (ref 40–129)
ALT SERPL-CCNC: 16 U/L (ref 10–40)
ANION GAP SERPL CALCULATED.3IONS-SCNC: 13 MMOL/L (ref 3–16)
AST SERPL-CCNC: 21 U/L (ref 15–37)
BASOPHILS ABSOLUTE: 0.1 K/UL (ref 0–0.2)
BASOPHILS RELATIVE PERCENT: 0.8 %
BILIRUB SERPL-MCNC: 0.3 MG/DL (ref 0–1)
BUN BLDV-MCNC: 8 MG/DL (ref 7–20)
CALCIUM SERPL-MCNC: 9.7 MG/DL (ref 8.3–10.6)
CHLORIDE BLD-SCNC: 104 MMOL/L (ref 99–110)
CO2: 24 MMOL/L (ref 21–32)
CREAT SERPL-MCNC: 0.6 MG/DL (ref 0.6–1.1)
EOSINOPHILS ABSOLUTE: 0.3 K/UL (ref 0–0.6)
EOSINOPHILS RELATIVE PERCENT: 3 %
ESTIMATED AVERAGE GLUCOSE: 122.6 MG/DL
FOLLICLE STIMULATING HORMONE: 3 MIU/ML
GFR AFRICAN AMERICAN: >60
GFR NON-AFRICAN AMERICAN: >60
GLOBULIN: 2.9 G/DL
GLUCOSE BLD-MCNC: 107 MG/DL (ref 70–99)
HBA1C MFR BLD: 5.9 %
HCT VFR BLD CALC: 39.1 % (ref 36–48)
HEMOGLOBIN: 13 G/DL (ref 12–16)
LYMPHOCYTES ABSOLUTE: 3.1 K/UL (ref 1–5.1)
LYMPHOCYTES RELATIVE PERCENT: 27.8 %
MCH RBC QN AUTO: 27.4 PG (ref 26–34)
MCHC RBC AUTO-ENTMCNC: 33.2 G/DL (ref 31–36)
MCV RBC AUTO: 82.3 FL (ref 80–100)
MONOCYTES ABSOLUTE: 0.5 K/UL (ref 0–1.3)
MONOCYTES RELATIVE PERCENT: 4.9 %
NEUTROPHILS ABSOLUTE: 7 K/UL (ref 1.7–7.7)
NEUTROPHILS RELATIVE PERCENT: 63.5 %
PDW BLD-RTO: 14.3 % (ref 12.4–15.4)
PLATELET # BLD: 282 K/UL (ref 135–450)
PMV BLD AUTO: 9.2 FL (ref 5–10.5)
POTASSIUM SERPL-SCNC: 3.7 MMOL/L (ref 3.5–5.1)
RBC # BLD: 4.75 M/UL (ref 4–5.2)
SODIUM BLD-SCNC: 141 MMOL/L (ref 136–145)
T4 FREE: 1.4 NG/DL (ref 0.9–1.8)
TOTAL PROTEIN: 7.5 G/DL (ref 6.4–8.2)
TSH REFLEX FT4: 4.29 UIU/ML (ref 0.27–4.2)
WBC # BLD: 11 K/UL (ref 4–11)

## 2020-07-10 DIAGNOSIS — N91.2 AMENORRHEA: ICD-10-CM

## 2020-07-10 LAB
LUTEINIZING HORMONE: 7.2 MIU/ML
PROGESTERONE LEVEL: 1.76 NG/ML
PROLACTIN: 37.4 NG/ML

## 2020-07-30 ENCOUNTER — HOSPITAL ENCOUNTER (EMERGENCY)
Age: 25
Discharge: HOME | End: 2020-07-30
Attending: EMERGENCY MEDICINE
Payer: COMMERCIAL

## 2020-07-30 ENCOUNTER — CARE COORDINATION (OUTPATIENT)
Dept: CARE COORDINATION | Age: 25
End: 2020-07-30

## 2020-07-30 ENCOUNTER — APPOINTMENT (OUTPATIENT)
Dept: GENERAL RADIOLOGY | Age: 25
End: 2020-07-30
Payer: COMMERCIAL

## 2020-07-30 VITALS
SYSTOLIC BLOOD PRESSURE: 123 MMHG | OXYGEN SATURATION: 96 % | HEART RATE: 93 BPM | DIASTOLIC BLOOD PRESSURE: 71 MMHG | WEIGHT: 272.27 LBS | RESPIRATION RATE: 16 BRPM | TEMPERATURE: 98.3 F | BODY MASS INDEX: 48.24 KG/M2 | HEIGHT: 63 IN

## 2020-07-30 LAB
BILIRUBIN URINE: NEGATIVE
BLOOD, URINE: NEGATIVE
CLARITY: ABNORMAL
COLOR: YELLOW
EPITHELIAL CELLS, UA: 9 /HPF (ref 0–5)
GLUCOSE URINE: NEGATIVE MG/DL
HCG(URINE) PREGNANCY TEST: NEGATIVE
HYALINE CASTS: 1 /LPF (ref 0–8)
KETONES, URINE: NEGATIVE MG/DL
LEUKOCYTE ESTERASE, URINE: NEGATIVE
MICROSCOPIC EXAMINATION: YES
NITRITE, URINE: NEGATIVE
PH UA: 6.5 (ref 5–8)
PROTEIN UA: NEGATIVE MG/DL
RBC UA: 1 /HPF (ref 0–4)
SARS-COV-2, NAAT: NOT DETECTED
SPECIFIC GRAVITY UA: 1.03 (ref 1–1.03)
URINE REFLEX TO CULTURE: ABNORMAL
URINE TYPE: ABNORMAL
UROBILINOGEN, URINE: 0.2 E.U./DL
WBC UA: 8 /HPF (ref 0–5)

## 2020-07-30 PROCEDURE — 81001 URINALYSIS AUTO W/SCOPE: CPT

## 2020-07-30 PROCEDURE — 99284 EMERGENCY DEPT VISIT MOD MDM: CPT

## 2020-07-30 PROCEDURE — U0002 COVID-19 LAB TEST NON-CDC: HCPCS

## 2020-07-30 PROCEDURE — 84703 CHORIONIC GONADOTROPIN ASSAY: CPT

## 2020-07-30 PROCEDURE — 71045 X-RAY EXAM CHEST 1 VIEW: CPT

## 2020-07-30 RX ORDER — ONDANSETRON 4 MG/1
4 TABLET, ORALLY DISINTEGRATING ORAL EVERY 12 HOURS PRN
Qty: 12 TABLET | Refills: 0 | Status: SHIPPED | OUTPATIENT
Start: 2020-07-30 | End: 2020-11-11

## 2020-07-30 RX ORDER — PREDNISONE 50 MG/1
50 TABLET ORAL DAILY
Qty: 5 TABLET | Refills: 0 | Status: SHIPPED | OUTPATIENT
Start: 2020-07-30 | End: 2020-08-04

## 2020-07-30 RX ORDER — AZITHROMYCIN 250 MG/1
TABLET, FILM COATED ORAL
Qty: 1 PACKET | Refills: 0 | Status: SHIPPED | OUTPATIENT
Start: 2020-07-30 | End: 2020-08-03

## 2020-07-30 ASSESSMENT — ENCOUNTER SYMPTOMS
CHOKING: 0
NAUSEA: 1
STRIDOR: 0
ABDOMINAL DISTENTION: 0
COUGH: 1
ABDOMINAL PAIN: 0
APNEA: 0
EYE ITCHING: 0
SHORTNESS OF BREATH: 0
CHEST TIGHTNESS: 0
EYE DISCHARGE: 0
WHEEZING: 0
VOMITING: 1
PHOTOPHOBIA: 0
EYE PAIN: 0
EYE REDNESS: 0

## 2020-07-30 NOTE — ED PROVIDER NOTES
decreased concentration, dysphoric mood, hallucinations, self-injury, sleep disturbance and suicidal ideas. The patient is not nervous/anxious and is not hyperactive. Except as noted above the remainder of the review of systems was reviewed and negative. PAST MEDICAL HISTORY     Past Medical History:   Diagnosis Date    Asthma     Bronchitis        SURGICAL HISTORY     History reviewed. No pertinent surgical history. CURRENT MEDICATIONS       Discharge Medication List as of 7/30/2020  3:36 AM      CONTINUE these medications which have NOT CHANGED    Details   albuterol sulfate HFA (VENTOLIN HFA) 108 (90 Base) MCG/ACT inhaler Inhale 2 puffs into the lungs 4 times daily as needed for Wheezing, Disp-1 Inhaler, R-0Normal             ALLERGIES     Patient has no known allergies. FAMILY HISTORY      History reviewed. No pertinent family history.     SOCIAL HISTORY       Social History     Socioeconomic History    Marital status: Single     Spouse name: None    Number of children: None    Years of education: None    Highest education level: None   Occupational History    None   Social Needs    Financial resource strain: None    Food insecurity     Worry: None     Inability: None    Transportation needs     Medical: None     Non-medical: None   Tobacco Use    Smoking status: Former Smoker    Smokeless tobacco: Never Used   Substance and Sexual Activity    Alcohol use: Yes     Comment: occ    Drug use: Yes     Types: Marijuana    Sexual activity: Yes   Lifestyle    Physical activity     Days per week: None     Minutes per session: None    Stress: None   Relationships    Social connections     Talks on phone: None     Gets together: None     Attends [employer] service: None     Active member of club or organization: None     Attends meetings of clubs or organizations: None     Relationship status: None    Intimate partner violence     Fear of current or ex partner: None     Emotionally abused: None     Physically abused: None     Forced sexual activity: None   Other Topics Concern    None   Social History Narrative    None       PHYSICAL EXAM       ED Triage Vitals [07/30/20 0229]   BP Temp Temp Source Pulse Resp SpO2 Height Weight   123/71 98.3 °F (36.8 °C) Oral 93 16 96 % 5' 3\" (1.6 m) 272 lb 4.3 oz (123.5 kg)       Physical Exam  Vitals signs and nursing note reviewed. Constitutional:       General: She is not in acute distress. Appearance: She is well-developed. She is not ill-appearing, toxic-appearing or diaphoretic. HENT:      Head: Normocephalic and atraumatic. Right Ear: External ear normal.      Left Ear: External ear normal.      Nose: Congestion present. Eyes:      General:         Right eye: No discharge. Left eye: No discharge. Conjunctiva/sclera: Conjunctivae normal.      Pupils: Pupils are equal, round, and reactive to light. Neck:      Musculoskeletal: Normal range of motion and neck supple. Cardiovascular:      Rate and Rhythm: Normal rate and regular rhythm. Heart sounds: No murmur. Pulmonary:      Effort: Pulmonary effort is normal. No respiratory distress. Breath sounds: Normal breath sounds. No wheezing or rales. Abdominal:      General: Bowel sounds are normal. There is no distension. Palpations: Abdomen is soft. There is no mass. Tenderness: There is no abdominal tenderness. There is no guarding or rebound. Genitourinary:     Comments: Deferred  Musculoskeletal: Normal range of motion. General: No deformity. Skin:     General: Skin is warm. Findings: No erythema or rash. Neurological:      Mental Status: She is alert and oriented to person, place, and time. She is not disoriented. Cranial Nerves: No cranial nerve deficit. Motor: No atrophy or abnormal muscle tone. Coordination: Coordination normal.   Psychiatric:         Behavior: Behavior normal.         Thought Content:  Thought content normal.         DIAGNOSTIC RESULTS     EKG: All EKG's are interpreted by the Emergency Department Physician who either signs or Co-signs this chart in the absence of acardiologist.    None    RADIOLOGY:   Non-plain film images such as CT, Ultrasoundand MRI are read by the radiologist. Plain radiographic images are visualized and preliminarily interpreted by the emergency physician with the below findings:    XR reassuring     ED BEDSIDE ULTRASOUND:   Performed by ED Physician - none    LABS:  Labs Reviewed   URINE RT REFLEX TO CULTURE - Abnormal; Notable for the following components:       Result Value    Clarity, UA CLOUDY (*)     All other components within normal limits    Narrative:     Performed at:  [facility]  [address], [place]   Phone [phone]   MICROSCOPIC URINALYSIS - Abnormal; Notable for the following components:    WBC, UA 8 (*)     Epithelial Cells, UA 9 (*)     All other components within normal limits    Narrative:     Performed at:  [facility]  [address] [facility] [address] [facility] [place] [address]   Phone [phone]    Narrative:     Performed at:  [facility]  [address] [place] [address]   Phone [phone]   PREGNANCY, URINE    Narrative:     Performed at:  [facility]  [address] [place] [address]   Phone [phone]       All other labs were withinnormal range or not returned as of this dictation. EMERGENCY DEPARTMENT COURSE and DIFFERENTIAL DIAGNOSIS/MDM:     PMH, Surgical Hx, FH, Social Hx reviewed by myself (ETOH usage, Tobacco usage, Drug usage reviewed by myself, no pertinent Hx)- No Pertinent Hx     Old records were reviewed by me    Concern for URI possible bronchitis possible COVID. Bacterial vs viral. Z pack. Supportive care.     I estimate there is LOW risk for Sepsis, MI, Stroke, Tamponade, PTX, Toxicity or other life threatening etiology thus I consider the discharge disposition reasonable. The patient is at low risk for mortality based on demographic, history and clinical factors. Given the best available information and clinical assessment, I estimate the risk of hospitalization to be greater than risk of treatment at home. I have explained to the patient that the risk could rapidly change, given precautions for return and instructions. Explained to patient that the risk for mortality is low based on demographic, history and clinical factors. I discussed with patient the results of evaluation in the ED, diagnosis, care, and prognosis. The plan is to discharge to home. Patient is in agreement with plan and questions have been answered. I also discussed with patient the reasons which may require a return visit and the importance of follow-up care. The patient is well-appearing, nontoxic, and improved at the time of discharge. Patient agrees to call to arrange follow-up care as directed. Patient understands to return immediately for worsening/change in symptoms. CRITICAL CARE TIME   Total Critical Caretime was 0 minutes, excluding separately reportable procedures. There was a high probability of clinically significant/life threatening deterioration in the patient's condition which required my urgent intervention. PROCEDURES:  Unlessotherwise noted below, none    FINAL IMPRESSION      1.  Acute upper respiratory infection          DISPOSITION/PLAN   DISPOSITION Decision To Discharge 07/30/2020 03:29:03 AM    PATIENT REFERRED TO:  ASHOK Umanzor CNP  [address]  [address] [facility]  [phone]            DISCHARGE MEDICATIONS:  Discharge Medication List as of 7/30/2020  3:36 AM      START taking these medications    Details   azithromycin (ZITHROMAX Z-HERON) 250 MG tablet Take 2 tablets (500 mg) on Day 1, and then take 1 tablet (250 mg) on days 2 through 5., Disp-1 packet,R-0Print      predniSONE (DELTASONE) 50 MG tablet Take 1 tablet by mouth daily for 5 days, Disp-5 tablet,R-0Print      ondansetron (ZOFRAN ODT) 4 MG disintegrating tablet Take 1 tablet by mouth every 12 hours as needed for Nausea May Sub regular tablet (non-ODT) if insurance does not cover ODT., Disp-12 tablet,R-0Print                (Please note that portions ofthis note were completed with a voice recognition program.  Efforts were made to edit the dictations but occasionally words are mis-transcribed.)    Jacquie Monterroso MD(electronically signed)  Attending Emergency Physician        Jacquie Monterroso MD  07/30/20 1234

## 2020-07-30 NOTE — LETTER
[facility] Emergency Department  [address] [facility] [address]  Phone: [phone]               July 30, 2020    Patient: Joy Jackson   YOB: 1995   Date of Visit: 7/30/2020       To Whom It May Concern:    Mario Tamez was seen and treated in our emergency department on 7/30/2020. She may return to work on 8-2-2020.       Sincerely,       MARLEY VILLAR         Signature:__________________________________

## 2020-07-30 NOTE — ED NOTES
D/C: Order noted for d/c. Pt confirmed d/c paperwork and prescriptions have correct name. Discharge and education instructions reviewed with patient. Teach-back successful. Pt verbalized understanding and signed d/c papers. Pt denied questions at this time. No acute distress noted. Patient instructed to follow-up as noted - return to emergency department if symptoms worsen. Patient verbalized understanding. Discharged per EDMD with discharge instructions. Pt discharged to private vehicle. Patient stable upon departure. Thanked patient for choosing [facility] for care.      Mariangel Briones RN  07/30/20 5454

## 2020-07-30 NOTE — CARE COORDINATION
Patient contacted regarding recent discharge and COVID-19 risk. Discussed COVID-19 related testing which was available at this time. Test results were negative. Patient informed of results, if available? Yes     Care Transition Nurse/ Ambulatory Care Manager contacted the patient by telephone to perform post discharge assessment. Verified name and  with patient as identifiers. Patient has following risk factors of: asthma. CTN/ACM reviewed discharge instructions, medical action plan and red flags related to discharge diagnosis. Reviewed and educated them on any new and changed medications related to discharge diagnosis. Advised obtaining a 90-day supply of all daily and as-needed medications. Patient seen in ED for Emesis and Dizziness. Patient says Emesis has resolved, however she is still having dizziness on rising. Suggested patient make an appointment with PCP. Patient agreed    Education provided regarding infection prevention, and signs and symptoms of COVID-19 and when to seek medical attention with patient who verbalized understanding. Discussed exposure protocols and quarantine from [address] you at higher risk for severe illness  and given an opportunity for questions and concerns. The patient agrees to contact the COVID-19 hotline [phone] or PCP office for questions related to their healthcare. CTN/ACM provided contact information for future reference. From CDC: Are you at higher risk for severe illness?  Wash your hands often.  Avoid close contact (6 feet, which is about two arm lengths) with people who are sick.  Put distance between yourself and other people if COVID-19 is spreading in your community.  Clean and disinfect frequently touched surfaces.  Avoid all cruise travel and non-essential air travel.  Call your healthcare professional if you have concerns about COVID-19 and your underlying condition or if you are sick.     For more information on steps you can take to protect yourself, see [facility]'s How to [address] [facility] for follow-up call in 7-14 days based on severity of symptoms and risk factors.

## 2020-08-18 ENCOUNTER — CARE COORDINATION (OUTPATIENT)
Dept: CARE COORDINATION | Age: 25
End: 2020-08-18

## 2020-08-18 NOTE — CARE COORDINATION
You Patient resolved from the Care Transitions episode on 7/29/2020  Discussed COVID-19 related testing which was available at this time. Test results were negative. Patient informed of results, if available? Yes    Patient/family has been provided the following resources and education related to COVID-19:                         Signs, symptoms and red flags related to COVID-19            CDC exposure and quarantine guidelines            [facility] exposure contact - [phone]            Contact for their local Department of Health                 Patient currently reports that the following symptoms have improved:  Emesis and Dizziness     No further outreach scheduled with this CTN/ACM. Episode of Care resolved. Patient has this CTN/ACM contact information if future needs arise.

## 2020-11-10 VITALS
TEMPERATURE: 97 F | RESPIRATION RATE: 17 BRPM | DIASTOLIC BLOOD PRESSURE: 86 MMHG | HEIGHT: 63 IN | HEART RATE: 85 BPM | BODY MASS INDEX: 49.65 KG/M2 | OXYGEN SATURATION: 98 % | WEIGHT: 280.2 LBS | SYSTOLIC BLOOD PRESSURE: 133 MMHG

## 2020-11-10 PROCEDURE — 99283 EMERGENCY DEPT VISIT LOW MDM: CPT

## 2020-11-11 ENCOUNTER — APPOINTMENT (OUTPATIENT)
Dept: GENERAL RADIOLOGY | Age: 25
End: 2020-11-11
Payer: COMMERCIAL

## 2020-11-11 ENCOUNTER — HOSPITAL ENCOUNTER (EMERGENCY)
Age: 25
Discharge: HOME | End: 2020-11-11
Attending: EMERGENCY MEDICINE
Payer: COMMERCIAL

## 2020-11-11 LAB
BILIRUBIN URINE: NEGATIVE
BLOOD, URINE: NEGATIVE
CLARITY: CLEAR
COLOR: YELLOW
GLUCOSE URINE: NEGATIVE MG/DL
HCG(URINE) PREGNANCY TEST: NEGATIVE
KETONES, URINE: NEGATIVE MG/DL
LEUKOCYTE ESTERASE, URINE: NEGATIVE
MICROSCOPIC EXAMINATION: NORMAL
NITRITE, URINE: NEGATIVE
PH UA: 6.5 (ref 5–8)
PROTEIN UA: NEGATIVE MG/DL
RAPID INFLUENZA  B AGN: NEGATIVE
RAPID INFLUENZA A AGN: POSITIVE
SARS-COV-2, PCR: NOT DETECTED
SPECIFIC GRAVITY UA: 1.02 (ref 1–1.03)
URINE REFLEX TO CULTURE: NORMAL
URINE TYPE: NORMAL
UROBILINOGEN, URINE: 0.2 E.U./DL

## 2020-11-11 PROCEDURE — 6370000000 HC RX 637 (ALT 250 FOR IP): Performed by: EMERGENCY MEDICINE

## 2020-11-11 PROCEDURE — 71045 X-RAY EXAM CHEST 1 VIEW: CPT

## 2020-11-11 PROCEDURE — U0003 INFECTIOUS AGENT DETECTION BY NUCLEIC ACID (DNA OR RNA); SEVERE ACUTE RESPIRATORY SYNDROME CORONAVIRUS 2 (SARS-COV-2) (CORONAVIRUS DISEASE [COVID-19]), AMPLIFIED PROBE TECHNIQUE, MAKING USE OF HIGH THROUGHPUT TECHNOLOGIES AS DESCRIBED BY CMS-2020-01-R: HCPCS

## 2020-11-11 PROCEDURE — 81003 URINALYSIS AUTO W/O SCOPE: CPT

## 2020-11-11 PROCEDURE — 87804 INFLUENZA ASSAY W/OPTIC: CPT

## 2020-11-11 PROCEDURE — 84703 CHORIONIC GONADOTROPIN ASSAY: CPT

## 2020-11-11 RX ORDER — ONDANSETRON 4 MG/1
4 TABLET, ORALLY DISINTEGRATING ORAL EVERY 12 HOURS PRN
Qty: 12 TABLET | Refills: 0 | Status: SHIPPED | OUTPATIENT
Start: 2020-11-11 | End: 2021-10-01 | Stop reason: SDUPTHER

## 2020-11-11 RX ORDER — NAPROXEN 250 MG/1
500 TABLET ORAL ONCE
Status: COMPLETED | OUTPATIENT
Start: 2020-11-11 | End: 2020-11-11

## 2020-11-11 RX ORDER — ONDANSETRON 4 MG/1
4 TABLET, ORALLY DISINTEGRATING ORAL ONCE
Status: COMPLETED | OUTPATIENT
Start: 2020-11-11 | End: 2020-11-11

## 2020-11-11 RX ORDER — OSELTAMIVIR PHOSPHATE 75 MG/1
75 CAPSULE ORAL 2 TIMES DAILY
Qty: 10 CAPSULE | Refills: 0 | Status: SHIPPED | OUTPATIENT
Start: 2020-11-11 | End: 2020-11-16

## 2020-11-11 RX ADMIN — NAPROXEN 500 MG: 250 TABLET ORAL at 02:43

## 2020-11-11 RX ADMIN — ONDANSETRON 4 MG: 4 TABLET, ORALLY DISINTEGRATING ORAL at 02:43

## 2020-11-11 ASSESSMENT — ENCOUNTER SYMPTOMS
NAUSEA: 1
SHORTNESS OF BREATH: 0
COUGH: 1
ABDOMINAL PAIN: 0

## 2020-11-11 ASSESSMENT — PAIN SCALES - GENERAL: PAINLEVEL_OUTOF10: 0

## 2020-11-11 NOTE — ED NOTES
Discharge instructions and home medications reviewed with patient, patient verbalized understanding  Ambulatory to exit without difficulty     Ashley Castro RN  11/11/20 8358

## 2020-11-11 NOTE — LETTER
[address] [facility]  [address] [facility] [address]  Phone: [phone]               November 11, 2020    Patient: Antonia Gomez   YOB: 1995   Date of Visit: 11/10/2020       To Whom It May Concern:    Sparkle Miner was seen and treated in our emergency department on 11/10/2020.  She may return to work on Monday 11/16/2020    Sincerely,     Murtaza Pitts         Signature:__________________________________

## 2020-11-11 NOTE — ED PROVIDER NOTES
[address] [facility]      Pt Name: Staci Borrero  MRN: [id number]  [place] 1995  Date of evaluation: 11/10/2020  Provider: Jazmin Sevilla MD    CHIEF COMPLAINT       Chief Complaint   Patient presents with    Fatigue       HISTORY OF PRESENT ILLNESS    Staci Borrero is a 23 y.o. female who presents to the emergency department with fatigue/cough/nausea. Endorses 1 day history symptoms. No SOB or chest pain. No other associated symptoms. Tolerating po. Nursing Notes were reviewed. Including nursing noted for FM, Surgical History, Past Medical History, Social History, vitals, and allergies; agree with all. REVIEW OF SYSTEMS       Review of Systems   Constitutional: Positive for activity change, appetite change and fatigue. HENT: Negative for dental problem and drooling. Respiratory: Positive for cough. Negative for shortness of breath. Cardiovascular: Negative for chest pain. Gastrointestinal: Positive for nausea. Negative for abdominal pain. Endocrine: Negative for cold intolerance and heat intolerance. Genitourinary: Negative for difficulty urinating. Neurological: Negative. Hematological: Negative. Psychiatric/Behavioral: Negative. Except as noted above the remainder of the review of systems was reviewed and negative. PAST MEDICAL HISTORY     Past Medical History:   Diagnosis Date    Asthma     Bronchitis        SURGICAL HISTORY     History reviewed. No pertinent surgical history. CURRENT MEDICATIONS       Previous Medications    ALBUTEROL SULFATE HFA (VENTOLIN HFA) 108 (90 BASE) MCG/ACT INHALER    Inhale 2 puffs into the lungs 4 times daily as needed for Wheezing       ALLERGIES     Patient has no known allergies. FAMILY HISTORY      History reviewed. No pertinent family history.     SOCIAL HISTORY       Social History     Socioeconomic History    Marital status: Single     Spouse name: None    Number of children: None    Years of education: None    Highest education level: None   Occupational History    None   Social Needs    Financial resource strain: None    Food insecurity     Worry: None     Inability: None    Transportation needs     Medical: None     Non-medical: None   Tobacco Use    Smoking status: Former Smoker    Smokeless tobacco: Never Used   Substance and Sexual Activity    Alcohol use: Yes     Comment: occ    Drug use: Yes     Types: Marijuana    Sexual activity: Yes   Lifestyle    Physical activity     Days per week: None     Minutes per session: None    Stress: None   Relationships    Social connections     Talks on phone: None     Gets together: None     Attends [employer] service: None     Active member of club or organization: None     Attends meetings of clubs or organizations: None     Relationship status: None    Intimate partner violence     Fear of current or ex partner: None     Emotionally abused: None     Physically abused: None     Forced sexual activity: None   Other Topics Concern    None   Social History Narrative    None       PHYSICAL EXAM       ED Triage Vitals [11/10/20 2335]   BP Temp Temp Source Pulse Resp SpO2 Height Weight   133/86 97 °F (36.1 °C) Tympanic 85 17 98 % 5' 3\" (1.6 m) 280 lb 3.3 oz (127.1 kg)       Physical Exam  Vitals signs and nursing note reviewed. Constitutional:       General: She is not in acute distress. Appearance: She is well-developed. She is not ill-appearing, toxic-appearing or diaphoretic. HENT:      Head: Normocephalic and atraumatic. Right Ear: External ear normal.      Left Ear: External ear normal.   Eyes:      General:         Right eye: No discharge. Left eye: No discharge. Conjunctiva/sclera: Conjunctivae normal.      Pupils: Pupils are equal, round, and reactive to light. Neck:      Musculoskeletal: Normal range of motion and neck supple.    Cardiovascular:      Rate and Rhythm: Normal rate and regular rhythm. Heart sounds: No murmur. Pulmonary:      Effort: Pulmonary effort is normal. No respiratory distress. Breath sounds: Normal breath sounds. No wheezing or rales. Abdominal:      General: Bowel sounds are normal. There is no distension. Palpations: Abdomen is soft. There is no mass. Tenderness: There is no abdominal tenderness. There is no guarding or rebound. Genitourinary:     Comments: Deferred  Musculoskeletal: Normal range of motion. General: No deformity. Skin:     General: Skin is warm. Findings: No erythema or rash. Neurological:      Mental Status: She is alert and oriented to person, place, and time. She is not disoriented. Cranial Nerves: No cranial nerve deficit. Motor: No atrophy or abnormal muscle tone. Coordination: Coordination normal.   Psychiatric:         Behavior: Behavior normal.         Thought Content:  Thought content normal.         DIAGNOSTIC RESULTS     EKG: All EKG's are interpreted by the Emergency Department Physician who either signs or Co-signs this chart in the absence of acardiologist.    None    RADIOLOGY:   Non-plain film images such as CT, Ultrasoundand MRI are read by the radiologist. Plain radiographic images are visualized and preliminarily interpreted by the emergency physician with the below findings:    CXR reassuring     ED BEDSIDE ULTRASOUND:   Performed by ED Physician - none    LABS:  Labs Reviewed   RAPID INFLUENZA A/B ANTIGENS - Abnormal; Notable for the following components:       Result Value    Rapid Influenza A Ag POSITIVE (*)     All other components within normal limits    Narrative:     Performed at:  [facility]  [address] [place], [place] [address]   Phone [phone]   URINE RT REFLEX TO CULTURE    Narrative:     Performed at:  [facility]  [address] [place], [place] [address]   Phone [phone] condition which required my urgent intervention. PROCEDURES:  Unlessotherwise noted below, none    FINAL IMPRESSION      1. Influenza A          DISPOSITION/PLAN   DISPOSITION Decision To Discharge 11/11/2020 03:40:23 AM    PATIENT REFERRED TO:  ASHOK Cintron - CNP  [address]  [address] [facility]  [phone]            DISCHARGE MEDICATIONS:  New Prescriptions    ONDANSETRON (ZOFRAN ODT) 4 MG DISINTEGRATING TABLET    Take 1 tablet by mouth every 12 hours as needed for Nausea May Sub regular tablet (non-ODT) if insurance does not cover ODT.     OSELTAMIVIR (TAMIFLU) 75 MG CAPSULE    Take 1 capsule by mouth 2 times daily for 5 days          (Please note that portions ofthis note were completed with a voice recognition program.  Efforts were made to edit the dictations but occasionally words are mis-transcribed.)    Dewayne Francois MD(electronically signed)  Attending Emergency Physician        Dewayne Francois MD  11/11/20 2472

## 2020-11-12 ENCOUNTER — CARE COORDINATION (OUTPATIENT)
Dept: CARE COORDINATION | Age: 25
End: 2020-11-12

## 2020-11-12 NOTE — CARE COORDINATION
Patient contacted regarding Berl Erb. Discussed COVID-19 related testing which was available at this time. Test results were negative. Patient informed of results, if available? Yes    Care Transition Nurse/ Ambulatory Care Manager contacted the patient by telephone to perform post discharge assessment. Call within 2 business days of discharge: Yes. Verified name and  with patient as identifiers. Provided introduction to self, and explanation of the CTN/ACM role, and reason for call due to risk factors for infection and/or exposure to COVID-19. Symptoms reviewed with patient who verbalized the following symptoms: no new symptoms and no worsening symptoms. Due to no new or worsening symptoms encounter was not routed to provider for escalation. Discussed follow-up appointments. If no appointment was previously scheduled, appointment scheduling offered: No; will self schedule for follow up per provider recommendation - plans outreach via [facility] [address] [place] [address] follow up appointment(s): No future appointments. Non-[facility] follow up appointment(s):     Non-face-to-face services provided:  Obtained and reviewed discharge summary and/or continuity of care documents     Advance Care Planning:   Does patient have an Advance Directive:  not on file. Patient has following risk factors of: asthma and influenza A. CTN/ACM reviewed discharge instructions, medical action plan and red flags such as increased shortness of breath, increasing fever and signs of decompensation with patient who verbalized understanding. Discussed exposure protocols and quarantine with CDC Guidelines What to do if you are sick with coronavirus disease .  Patient was given an opportunity for questions and concerns. The patient agrees to contact the [facility] exposure line [phone], [facility] department [place] [facility]: ([phone]) and PCP office for questions related to their healthcare.  CTN/ACM provided contact information for future needs. Reviewed and educated patient on any new and changed medications related to discharge diagnosis     Patient/family/caregiver given information for GetWell Loop and agrees to enroll yes  Patient's preferred e-mail: [email or web address]   Patient's preferred phone number: [phone]  Based on Loop alert triggers, patient will be contacted by nurse care manager for worsening symptoms. Pt verbalized understanding of above and agreement with the following  Plan:  Pt will be further monitored by COVID Loop Team based on severity of symptoms and risk factors.

## 2021-06-08 ENCOUNTER — APPOINTMENT (OUTPATIENT)
Dept: GENERAL RADIOLOGY | Age: 26
End: 2021-06-08

## 2021-06-08 ENCOUNTER — HOSPITAL ENCOUNTER (EMERGENCY)
Age: 26
Discharge: HOME | End: 2021-06-08

## 2021-06-08 VITALS
BODY MASS INDEX: 53.55 KG/M2 | HEIGHT: 62 IN | RESPIRATION RATE: 16 BRPM | OXYGEN SATURATION: 97 % | WEIGHT: 291.01 LBS | TEMPERATURE: 97.9 F | HEART RATE: 81 BPM | SYSTOLIC BLOOD PRESSURE: 132 MMHG | DIASTOLIC BLOOD PRESSURE: 82 MMHG

## 2021-06-08 DIAGNOSIS — M25.561 ACUTE PAIN OF RIGHT KNEE: Primary | ICD-10-CM

## 2021-06-08 PROCEDURE — 6370000000 HC RX 637 (ALT 250 FOR IP): Performed by: NURSE PRACTITIONER

## 2021-06-08 PROCEDURE — 73560 X-RAY EXAM OF KNEE 1 OR 2: CPT

## 2021-06-08 PROCEDURE — 99284 EMERGENCY DEPT VISIT MOD MDM: CPT

## 2021-06-08 RX ORDER — HYDROCODONE BITARTRATE AND ACETAMINOPHEN 5; 325 MG/1; MG/1
1 TABLET ORAL ONCE
Status: COMPLETED | OUTPATIENT
Start: 2021-06-08 | End: 2021-06-08

## 2021-06-08 RX ORDER — IBUPROFEN 800 MG/1
800 TABLET ORAL EVERY 8 HOURS PRN
Qty: 20 TABLET | Refills: 0 | OUTPATIENT
Start: 2021-06-08 | End: 2021-10-15

## 2021-06-08 RX ORDER — CYCLOBENZAPRINE HCL 10 MG
10 TABLET ORAL 3 TIMES DAILY PRN
Qty: 20 TABLET | Refills: 0 | Status: SHIPPED | OUTPATIENT
Start: 2021-06-08 | End: 2021-06-15

## 2021-06-08 RX ORDER — ACETAMINOPHEN 500 MG
1000 TABLET ORAL 4 TIMES DAILY PRN
Qty: 60 TABLET | Refills: 0 | Status: SHIPPED | OUTPATIENT
Start: 2021-06-08 | End: 2021-10-19 | Stop reason: ALTCHOICE

## 2021-06-08 RX ADMIN — HYDROCODONE BITARTRATE AND ACETAMINOPHEN 1 TABLET: 5; 325 TABLET ORAL at 18:41

## 2021-06-08 ASSESSMENT — PAIN DESCRIPTION - DESCRIPTORS: DESCRIPTORS: SHARP;SHOOTING

## 2021-06-08 ASSESSMENT — PAIN SCALES - GENERAL
PAINLEVEL_OUTOF10: 10
PAINLEVEL_OUTOF10: 10

## 2021-06-08 ASSESSMENT — PAIN DESCRIPTION - ORIENTATION: ORIENTATION: RIGHT

## 2021-06-08 ASSESSMENT — PAIN DESCRIPTION - LOCATION: LOCATION: KNEE

## 2021-06-08 NOTE — ED PROVIDER NOTES
[address]  [address] [address]  Dept: [phone]  Loc: [address] ENCOUNTER        This patient was not seen or evaluated by the attending physician. I evaluated this patient, the attending physician was available for consultation. CHIEF COMPLAINT    Chief Complaint   Patient presents with    Knee Pain     right knee pain, no fall or injury. She was lifting and moving some boxes yesterday        HPI    Kathryn Wright is a 23 y.o. female who presents with right knee pain. Onset was yesterday, was helping an individual lift and move heavy boxes, has had increased pain since. The duration has been constant since the onset. The severity of the pain is 10/10. The pain worsens with ambulation. Denies any numbness or tingling distal to the knee. Came to the ED for further evaluation and treatment. REVIEW OF SYSTEMS    General: No fever or chills  Skin: No Rashes or redness of the skin  Musculoskeletal: See HPI    PAST MEDICAL & SURGICAL HISTORY    Past Medical History:   Diagnosis Date    Asthma     Bronchitis      History reviewed. No pertinent surgical history. CURRENT MEDICATIONS  (may include discharge medications prescribed in the ED)  Current Outpatient Rx   Medication Sig Dispense Refill    ibuprofen (IBU) 800 MG tablet Take 1 tablet by mouth every 8 hours as needed for Pain 20 tablet 0    acetaminophen (TYLENOL) 500 MG tablet Take 2 tablets by mouth 4 times daily as needed for Pain 60 tablet 0    cyclobenzaprine (FLEXERIL) 10 MG tablet Take 1 tablet by mouth 3 times daily as needed for Muscle spasms 20 tablet 0    ondansetron (ZOFRAN ODT) 4 MG disintegrating tablet Take 1 tablet by mouth every 12 hours as needed for Nausea May Sub regular tablet (non-ODT) if insurance does not cover ODT.  12 tablet 0    albuterol sulfate HFA (VENTOLIN HFA) 108 (90 Base) MCG/ACT inhaler Inhale 2 puffs into the lungs 4 times daily as needed for Wheezing 1 Inhaler 0       ALLERGIES    No Known Allergies    SOCIAL & FAMILY HISTORY    Social History     Socioeconomic History    Marital status: Single     Spouse name: None    Number of children: None    Years of education: None    Highest education level: None   Occupational History    None   Tobacco Use    Smoking status: Former Smoker    Smokeless tobacco: Never Used   Vaping Use    Vaping Use: Never used   Substance and Sexual Activity    Alcohol use: Yes     Comment: occ    Drug use: Yes     Types: Marijuana    Sexual activity: Yes   Other Topics Concern    None   Social History Narrative    None     Social Determinants of Health     Financial Resource Strain:     Difficulty of Paying Living Expenses:    Food Insecurity:     Worried About Running Out of Food in the Last Year:     Ran Out of Food in the Last Year:    Transportation Needs:     Lack of Transportation (Medical):  Lack of Transportation (Non-Medical):    Physical Activity:     Days of Exercise per Week:     Minutes of Exercise per Session:    Stress:     Feeling of Stress :    Social Connections:     Frequency of Communication with Friends and Family:     Frequency of Social Gatherings with Friends and Family:     Attends [employer] Services:     Active Member of Clubs or Organizations:     Attends Club or Organization Meetings:     Marital Status:    Intimate Partner Violence:     Fear of Current or Ex-Partner:     Emotionally Abused:     Physically Abused:     Sexually Abused:      History reviewed. No pertinent family history.       PHYSICAL EXAM    VITAL SIGNS: BP (!) 144/89   Pulse 95   Temp 97.9 °F (36.6 °C) (Temporal)   Resp 20   Ht 5' 2\" (1.575 m)   Wt 291 lb 0.1 oz (132 kg)   SpO2 95%   BMI 53.23 kg/m²   Constitutional:  Well developed, no acute distress  HENT:  Atraumatic, Moist mucous membranes  Neck: normal range of motion, supple   Respiratory: No retractions   Cardiovascular:  No JVD   Musculoskeletal:  Exam of the affected knee reveals no joint instability, no edema or deformity. Extensor mechanism is intact (Patient is able to extend the leg against gravity, demonstrating that the quadriceps tendon and the patella tendon are intact.)  Vascular: DP pulses 2+ on the same side as the knee pain (distal to the affected knee). Integument:  Well hydrated, no erythema or warmth of the affected knee  Neurologic:  Awake and alert, no slurred speech   Psychiatric: Cooperative, pleasant affect    RADIOLOGY/PROCEDURES    XR KNEE RIGHT (1-2 VIEWS)   Final Result   No acute abnormality of the knee. ED COURSE & MEDICAL DECISION MAKING    Pertinent Imaging studies reviewed and interpreted. (See EMR for details)  See electronic record for medications ordered. Knee immobilizer and crutches ordered     Differential diagnosis: includes but not limited to Meniscus tear, ACL tear, other ligamental injury or strain, patellar fracture, tibial plateau fracture, extensor mechanism rupture, dislocation, Arterial Injury/Ischemia, Infection, Neurologic Deficit/Injury. No evidence of neurovascular injury on exam.  Plain films as above. I explained to the patient that there may be ligamentous/meniscal injury not seen on plain films, and that they will require follow-up with orthopedics for further evaluation. I have a low suspicion for any patella fracture is direct injury to the patellar was not sustained. I believe the patient is safe for discharge at this time. I'll prescribe oral analgesics and provide orthopedic referral.    The patient was instructed to follow up as an outpatient in 3 days. The patient was instructed to return to the ED immediately for any new or worsening symptoms. The patient verbalized understanding. FINAL IMPRESSION    1.  Acute pain of right knee        PLAN  Discharge with close outpatient follow-up (see EMR)     (Please note that

## 2021-06-08 NOTE — LETTER
[facility] Emergency Department  [address] [address]  Phone: [phone]               June 8, 2021    Patient: Kelly Rudd   YOB: 1995   Date of Visit: 6/8/2021       To Whom It May Concern:    Stephanie Mccrary was seen and treated in our emergency department on 6/8/2021. She may return to work on 6/12/2021.       Sincerely,           Signature:__________________________________

## 2021-10-01 ENCOUNTER — HOSPITAL ENCOUNTER (EMERGENCY)
Age: 26
Discharge: HOME | End: 2021-10-01
Attending: EMERGENCY MEDICINE

## 2021-10-01 VITALS
BODY MASS INDEX: 49.79 KG/M2 | OXYGEN SATURATION: 99 % | HEART RATE: 68 BPM | TEMPERATURE: 97.9 F | RESPIRATION RATE: 16 BRPM | DIASTOLIC BLOOD PRESSURE: 89 MMHG | WEIGHT: 281 LBS | SYSTOLIC BLOOD PRESSURE: 153 MMHG | HEIGHT: 63 IN

## 2021-10-01 DIAGNOSIS — R11.2 NON-INTRACTABLE VOMITING WITH NAUSEA, UNSPECIFIED VOMITING TYPE: Primary | ICD-10-CM

## 2021-10-01 LAB
A/G RATIO: 1.3 (ref 1.1–2.2)
ALBUMIN SERPL-MCNC: 4.5 G/DL (ref 3.4–5)
ALP BLD-CCNC: 86 U/L (ref 40–129)
ALT SERPL-CCNC: 24 U/L (ref 10–40)
ANION GAP SERPL CALCULATED.3IONS-SCNC: 12 MMOL/L (ref 3–16)
AST SERPL-CCNC: 27 U/L (ref 15–37)
BASE EXCESS VENOUS: 1.8 MMOL/L (ref -2–3)
BASOPHILS ABSOLUTE: 0.1 K/UL (ref 0–0.2)
BASOPHILS RELATIVE PERCENT: 1.1 %
BILIRUB SERPL-MCNC: 0.3 MG/DL (ref 0–1)
BILIRUBIN URINE: NEGATIVE
BLOOD, URINE: NEGATIVE
BUN BLDV-MCNC: 9 MG/DL (ref 7–20)
CALCIUM SERPL-MCNC: 9.2 MG/DL (ref 8.3–10.6)
CARBOXYHEMOGLOBIN: 1.4 % (ref 0–1.5)
CHLORIDE BLD-SCNC: 104 MMOL/L (ref 99–110)
CLARITY: CLEAR
CO2: 24 MMOL/L (ref 21–32)
COLOR: YELLOW
CREAT SERPL-MCNC: <0.5 MG/DL (ref 0.6–1.1)
EOSINOPHILS ABSOLUTE: 0.3 K/UL (ref 0–0.6)
EOSINOPHILS RELATIVE PERCENT: 3.7 %
GFR AFRICAN AMERICAN: >60
GFR NON-AFRICAN AMERICAN: >60
GLOBULIN: 3.4 G/DL
GLUCOSE BLD-MCNC: 90 MG/DL (ref 70–99)
GLUCOSE URINE: NEGATIVE MG/DL
HCG(URINE) PREGNANCY TEST: NEGATIVE
HCO3 VENOUS: 28.8 MMOL/L (ref 24–28)
HCT VFR BLD CALC: 40.4 % (ref 36–48)
HEMOGLOBIN, VEN, REDUCED: 23.7 %
HEMOGLOBIN: 13.3 G/DL (ref 12–16)
KETONES, URINE: NEGATIVE MG/DL
LEUKOCYTE ESTERASE, URINE: NEGATIVE
LIPASE: 32 U/L (ref 13–60)
LYMPHOCYTES ABSOLUTE: 2.6 K/UL (ref 1–5.1)
LYMPHOCYTES RELATIVE PERCENT: 28.4 %
MCH RBC QN AUTO: 27.4 PG (ref 26–34)
MCHC RBC AUTO-ENTMCNC: 33 G/DL (ref 31–36)
MCV RBC AUTO: 83 FL (ref 80–100)
METHEMOGLOBIN VENOUS: 0.2 % (ref 0–1.5)
MICROSCOPIC EXAMINATION: NORMAL
MONOCYTES ABSOLUTE: 0.4 K/UL (ref 0–1.3)
MONOCYTES RELATIVE PERCENT: 4.9 %
NEUTROPHILS ABSOLUTE: 5.6 K/UL (ref 1.7–7.7)
NEUTROPHILS RELATIVE PERCENT: 61.9 %
NITRITE, URINE: NEGATIVE
O2 SAT, VEN: 76 %
PCO2, VEN: 55.3 MMHG (ref 41–51)
PDW BLD-RTO: 14.6 % (ref 12.4–15.4)
PH UA: 6.5 (ref 5–8)
PH VENOUS: 7.33 (ref 7.35–7.45)
PLATELET # BLD: 320 K/UL (ref 135–450)
PMV BLD AUTO: 9.3 FL (ref 5–10.5)
PO2, VEN: 42.6 MMHG (ref 25–40)
POTASSIUM REFLEX MAGNESIUM: 4.6 MMOL/L (ref 3.5–5.1)
PROTEIN UA: NEGATIVE MG/DL
RBC # BLD: 4.87 M/UL (ref 4–5.2)
SODIUM BLD-SCNC: 140 MMOL/L (ref 136–145)
SPECIFIC GRAVITY UA: 1.02 (ref 1–1.03)
TCO2 CALC VENOUS: 31 MMOL/L
TOTAL PROTEIN: 7.9 G/DL (ref 6.4–8.2)
URINE REFLEX TO CULTURE: NORMAL
URINE TYPE: NORMAL
UROBILINOGEN, URINE: 0.2 E.U./DL
WBC # BLD: 9 K/UL (ref 4–11)

## 2021-10-01 PROCEDURE — 84703 CHORIONIC GONADOTROPIN ASSAY: CPT

## 2021-10-01 PROCEDURE — 96374 THER/PROPH/DIAG INJ IV PUSH: CPT

## 2021-10-01 PROCEDURE — 99283 EMERGENCY DEPT VISIT LOW MDM: CPT

## 2021-10-01 PROCEDURE — 2580000003 HC RX 258: Performed by: STUDENT IN AN ORGANIZED HEALTH CARE EDUCATION/TRAINING PROGRAM

## 2021-10-01 PROCEDURE — 81003 URINALYSIS AUTO W/O SCOPE: CPT

## 2021-10-01 PROCEDURE — 80053 COMPREHEN METABOLIC PANEL: CPT

## 2021-10-01 PROCEDURE — 36415 COLL VENOUS BLD VENIPUNCTURE: CPT

## 2021-10-01 PROCEDURE — 83690 ASSAY OF LIPASE: CPT

## 2021-10-01 PROCEDURE — 6370000000 HC RX 637 (ALT 250 FOR IP): Performed by: STUDENT IN AN ORGANIZED HEALTH CARE EDUCATION/TRAINING PROGRAM

## 2021-10-01 PROCEDURE — 82803 BLOOD GASES ANY COMBINATION: CPT

## 2021-10-01 PROCEDURE — 85025 COMPLETE CBC W/AUTO DIFF WBC: CPT

## 2021-10-01 PROCEDURE — 6360000002 HC RX W HCPCS

## 2021-10-01 RX ORDER — ACETAMINOPHEN 500 MG
1000 TABLET ORAL ONCE
Status: COMPLETED | OUTPATIENT
Start: 2021-10-01 | End: 2021-10-01

## 2021-10-01 RX ORDER — ONDANSETRON 2 MG/ML
INJECTION INTRAMUSCULAR; INTRAVENOUS
Status: DISCONTINUED
Start: 2021-10-01 | End: 2021-10-01 | Stop reason: HOSPADM

## 2021-10-01 RX ORDER — ONDANSETRON 2 MG/ML
8 INJECTION INTRAMUSCULAR; INTRAVENOUS ONCE
Status: COMPLETED | OUTPATIENT
Start: 2021-10-01 | End: 2021-10-01

## 2021-10-01 RX ORDER — ONDANSETRON 2 MG/ML
INJECTION INTRAMUSCULAR; INTRAVENOUS
Status: COMPLETED
Start: 2021-10-01 | End: 2021-10-01

## 2021-10-01 RX ORDER — SODIUM CHLORIDE, SODIUM LACTATE, POTASSIUM CHLORIDE, CALCIUM CHLORIDE 600; 310; 30; 20 MG/100ML; MG/100ML; MG/100ML; MG/100ML
1000 INJECTION, SOLUTION INTRAVENOUS ONCE
Status: COMPLETED | OUTPATIENT
Start: 2021-10-01 | End: 2021-10-01

## 2021-10-01 RX ORDER — ONDANSETRON 4 MG/1
8 TABLET, ORALLY DISINTEGRATING ORAL EVERY 8 HOURS PRN
Qty: 12 TABLET | Refills: 0 | Status: SHIPPED | OUTPATIENT
Start: 2021-10-01 | End: 2021-10-10

## 2021-10-01 RX ADMIN — SODIUM CHLORIDE, POTASSIUM CHLORIDE, SODIUM LACTATE AND CALCIUM CHLORIDE 1000 ML: 600; 310; 30; 20 INJECTION, SOLUTION INTRAVENOUS at 10:38

## 2021-10-01 RX ADMIN — ONDANSETRON 8 MG: 2 INJECTION INTRAMUSCULAR; INTRAVENOUS at 10:39

## 2021-10-01 RX ADMIN — ACETAMINOPHEN 1000 MG: 500 TABLET, FILM COATED ORAL at 12:08

## 2021-10-01 ASSESSMENT — ENCOUNTER SYMPTOMS
SHORTNESS OF BREATH: 0
COUGH: 0
ABDOMINAL PAIN: 0
EYE PAIN: 0
CONSTIPATION: 0
VOMITING: 1
NAUSEA: 1
DIARRHEA: 1
SORE THROAT: 0
SINUS PRESSURE: 0

## 2021-10-01 ASSESSMENT — PAIN DESCRIPTION - LOCATION: LOCATION: ABDOMEN

## 2021-10-01 ASSESSMENT — PAIN SCALES - GENERAL
PAINLEVEL_OUTOF10: 5
PAINLEVEL_OUTOF10: 5

## 2021-10-01 ASSESSMENT — PAIN DESCRIPTION - ORIENTATION: ORIENTATION: MID

## 2021-10-01 ASSESSMENT — PAIN DESCRIPTION - PAIN TYPE: TYPE: ACUTE PAIN

## 2021-10-01 ASSESSMENT — PAIN DESCRIPTION - DESCRIPTORS: DESCRIPTORS: CRAMPING

## 2021-10-01 NOTE — ED NOTES
Nutrition Care Plan    Nutrition Diagnosis:   Inadequate intake related to unknown etiology as evidenced by patient has been refusing meals x2 days, per chart review. (Resolving)    Intervention:  General/healthful diet:  Continue regular diet, no food allergies. Encourage adequate food and fluid intake.   · Writer attempted to meet with patient, however patient not receptive to writer.   · Writer spoke to nurse who reports patient consumes her meals as long as her tray is brought to her.   · Patient did not receive her breakfast tray so she did not did eat. Writer provided patient with new breakfast tray.   · RN to attempt to complete menu with patient  Commercial beverage:   discontinued order for oral supplement due to patient refusing.     Monitoring and Evaluation:  Amount of food:  Goal-patient to consume >75% of meals. Will monitor intakes. --goal met--per chart review, patient has been taking her trays and consuming adequate amounts    PT sent to restroom to provide a urine sample and instructed to change into a gown for md exam.     Isaac Iraheta RN  10/01/21 3127

## 2021-10-01 NOTE — ED NOTES
Pt given warm blanket. No other needs at this time. Call light in reach.       Disha Mayo RN  10/01/21 7075

## 2021-10-01 NOTE — ED PROVIDER NOTES
[address] [facility]          EM RESIDENT NOTE       Date of evaluation: 10/1/2021    Chief Complaint     Abdominal Pain (mid abd pain started this am x2 episodes of emesis with diah), Nausea, and Diarrhea      of Present Illness     Liza Vaughan is a 27 y.o. female who presents with vomiting and diarrhea for 1 day. Patient reports having nonbloody \"diarrhea\" yesterday, however she describes looser than normal stools 4 times yesterday. She reported to work today and felt nauseated, had 3 episodes of nonbloody, nonbilious emesis. Endorses slight epigastric tenderness. Denies other symptoms. Fevers, no chills, no sore throat, cough, shortness of breath. No sick contacts. She was seen during the COVID-19 pandemic. She reports no previous Covid vaccinations. States she cannot remember when her last menstrual period was. States she has amenorrhea, which has been worked up in the past.  States she uses marijuana occasionally. No daily use. No increased use recently. Review of Systems     Review of Systems   Constitutional: Negative for chills, fatigue and fever. HENT: Negative for sinus pressure and sore throat. Eyes: Negative for pain and visual disturbance. Respiratory: Negative for cough and shortness of breath. Cardiovascular: Negative for chest pain, palpitations and leg swelling. Gastrointestinal: Positive for diarrhea, nausea and vomiting. Negative for abdominal pain and constipation. Genitourinary: Negative for dysuria, frequency, hematuria, urgency, vaginal bleeding and vaginal discharge. Musculoskeletal: Negative for arthralgias and myalgias. Skin: Negative for rash and wound. Neurological: Negative for syncope, weakness and headaches. Psychiatric/Behavioral: Negative for agitation. The patient is not nervous/anxious.         Past Medical, Surgical, Family, and Social History     She has a past medical history of Asthma and Bronchitis. She has no past surgical history on file. Her family history is not on file. She reports that she has quit smoking. She has never used smokeless tobacco. She reports current alcohol use. She reports current drug use. Drug: Marijuana. Medications     Current Discharge Medication List      CONTINUE these medications which have NOT CHANGED    Details   ibuprofen (IBU) 800 MG tablet Take 1 tablet by mouth every 8 hours as needed for Pain  Qty: 20 tablet, Refills: 0      acetaminophen (TYLENOL) 500 MG tablet Take 2 tablets by mouth 4 times daily as needed for Pain  Qty: 60 tablet, Refills: 0      albuterol sulfate HFA (VENTOLIN HFA) 108 (90 Base) MCG/ACT inhaler Inhale 2 puffs into the lungs 4 times daily as needed for Wheezing  Qty: 1 Inhaler, Refills: 0    Associated Diagnoses: Mild intermittent asthma, unspecified whether complicated             Allergies     She has No Known Allergies. Physical Exam     INITIAL VITALS: BP: (!) 171/98, Temp: 97.9 °F (36.6 °C), Pulse: 80, Resp: 16, SpO2: 100 %   Physical Exam    Constitutional:  27 y.o. female, obese; well developed; in no apparent distress. HEENT:  Normocephalic, atraumatic. Mucous membranes are moist.  Eyes: Anicteric, PERRL, EOMI  Neck:  Supple, Full ROM, trachea midline  Pulmonary:   Lungs clear to auscultation bilaterally with symmetric aeration. No Wheezes/Rales/Rhonchi. Cardiac:  Regular rate and rhythm. No murmurs/rubs/gallops. Abdomen: Nice abdomen. Soft,  non-distended. Slight epigastric tenderness. No guarding  Extremities: 2+ radial pulses. No extremity deformity, swelling or tenderness appreciated. Skin:  No rashes or bruising. Neuro:  Alert and oriented x3. Speech normal. Moves UE and LE spontaneously and symmetrically  Psych:  Mood and affect appropriate for situation.      DiagnosticResults     RADIOLOGY:  No orders to display       LABS:   Results for orders placed or performed during the hospital encounter of 10/01/21 Urinalysis Reflex to Culture    Specimen: Urine, clean catch   Result Value Ref Range    Urine Type NotGiven        ED BEDSIDE ULTRASOUND:  None    RECENT VITALS:  BP: (!) 171/98, Temp: 97.9 °F (36.6 °C), Pulse: 80,Resp: 16, SpO2: 100 %     Procedures     None    ED Course     Nursing Notes, Past Medical Hx, Past Surgical Hx, Social Hx, Allergies, and Family Hx were reviewed. The patient was given the followingmedications:  Orders Placed This Encounter   Medications    lactated ringers infusion 1,000 mL    ondansetron (ZOFRAN) injection 8 mg    ondansetron (ZOFRAN) 4 MG/2ML injection     [place]: cabinet override    ondansetron (ZOFRAN) 4 MG/2ML injection     [place]: cabinet override    ondansetron (ZOFRAN ODT) 4 MG disintegrating tablet     Sig: Place 2 tablets under the tongue every 8 hours as needed for Nausea May Sub regular tablet (non-ODT) if insurance does not cover ODT. Dispense:  12 tablet     Refill:  0       CONSULTS:  None    MEDICAL DECISION MAKING / ASSESSMENT / PLAN     Kenji Ford is a 27 y.o. female who presents with nausea and vomiting. Normal WBC, liver enzymes, lipase, alk phos, and bilirubin with no RUQ tenderness makes pancreatitis, hepatitis, choledocholithiasis and biliary stasis unlikely. Benign urinalysis and lack of CVA tenderness and fever make UTI or pyelonephritis unlikely. Given no history of forceful vomiting and few risk factors, ruptured esophagus is unlikely. Minimal cardiac history and few risk factors makes atypical presentation of ACS unlikely. Non-pleuritic pain, clear lungs on exam and normal WBC makes pneumonia, pneumothorax, PE, or pleural effusion unlikely causes of the patient's chest pain. Given the lack of RLQ or LLQ pain appendicitis or diverticulitis are unlikely. Further, mesenteric ischemia is also low on our differential given the history, patient's age and minimal risk factors.  There is no evidence of obstruction on our exam today and the patient is tolerating PO intake prior to discharge. Patient was seen during the [place] 19 pandemic and unvaccinated. However isolated nausea vomiting and diarrhea in the absence of fever, sick contacts or general malaise make COVID-19 less likely. I offered COVID-19 testing to the patient. There were shared decision making patient declined testing at this time. She will remain off work while symptomatic and isolate from others. Risks, benefits, and alternatives were discussed. At this time the patient has been deemed safe for discharge. My customary discharge instructions including strict return precautions for worsening or new symptoms have been communicated. This patient was also evaluated by the attending physician. All care plans were discussed and agreed upon. Clinical Impression     1. Non-intractable vomiting with nausea, unspecified vomiting type        Disposition     PATIENT REFERRED TO:  No follow-up provider specified.     DISCHARGE MEDICATIONS:  Current Discharge Medication List          DISPOSITION       Gisele Yanes MD  Resident  10/02/21 7703

## 2021-10-01 NOTE — ED PROVIDER NOTES
ED Attending Attestation Note     Date of evaluation: 10/1/2021    This patient was seen by the resident. I have seen and examined the patient, agree with the workup, evaluation, management and diagnosis. The care plan has been discussed. I have reviewed the ECG and concur with the resident's interpretation. My assessment reveals adult female with complaint of nausea today, diarrhea yesterday. She appears relatively well-hydrated. Her abdomen is soft and nontender on my examination. She is able to take p.o. today. Labs are nonactionable.        Nichole Khan MD  10/01/21 7072

## 2021-10-15 ENCOUNTER — HOSPITAL ENCOUNTER (EMERGENCY)
Age: 26
Discharge: HOME | End: 2021-10-15
Attending: EMERGENCY MEDICINE

## 2021-10-15 VITALS
DIASTOLIC BLOOD PRESSURE: 77 MMHG | WEIGHT: 293 LBS | SYSTOLIC BLOOD PRESSURE: 128 MMHG | HEIGHT: 63 IN | HEART RATE: 83 BPM | OXYGEN SATURATION: 97 % | BODY MASS INDEX: 51.91 KG/M2 | RESPIRATION RATE: 20 BRPM | TEMPERATURE: 98.3 F

## 2021-10-15 DIAGNOSIS — R51.9 ACUTE NONINTRACTABLE HEADACHE, UNSPECIFIED HEADACHE TYPE: Primary | ICD-10-CM

## 2021-10-15 PROCEDURE — 6370000000 HC RX 637 (ALT 250 FOR IP): Performed by: EMERGENCY MEDICINE

## 2021-10-15 PROCEDURE — 99284 EMERGENCY DEPT VISIT MOD MDM: CPT

## 2021-10-15 RX ORDER — PROMETHAZINE HYDROCHLORIDE 25 MG/1
25 TABLET ORAL EVERY 8 HOURS PRN
Qty: 20 TABLET | Refills: 0 | Status: SHIPPED | OUTPATIENT
Start: 2021-10-15 | End: 2021-10-19 | Stop reason: ALTCHOICE

## 2021-10-15 RX ORDER — ONDANSETRON 4 MG/1
4 TABLET, ORALLY DISINTEGRATING ORAL ONCE
Status: COMPLETED | OUTPATIENT
Start: 2021-10-15 | End: 2021-10-15

## 2021-10-15 RX ORDER — NAPROXEN 250 MG/1
500 TABLET ORAL ONCE
Status: COMPLETED | OUTPATIENT
Start: 2021-10-15 | End: 2021-10-15

## 2021-10-15 RX ORDER — BUTALBITAL, ACETAMINOPHEN AND CAFFEINE 300; 40; 50 MG/1; MG/1; MG/1
1 CAPSULE ORAL EVERY 6 HOURS PRN
Qty: 20 CAPSULE | Refills: 0 | Status: ON HOLD | OUTPATIENT
Start: 2021-10-15 | End: 2021-11-01 | Stop reason: ALTCHOICE

## 2021-10-15 RX ORDER — ONDANSETRON 4 MG/1
4 TABLET, ORALLY DISINTEGRATING ORAL EVERY 8 HOURS PRN
Qty: 20 TABLET | Refills: 0 | Status: SHIPPED | OUTPATIENT
Start: 2021-10-15 | End: 2021-10-19 | Stop reason: ALTCHOICE

## 2021-10-15 RX ORDER — NAPROXEN 500 MG/1
500 TABLET ORAL 2 TIMES DAILY PRN
Qty: 60 TABLET | Refills: 0 | Status: SHIPPED | OUTPATIENT
Start: 2021-10-15 | End: 2021-10-19 | Stop reason: ALTCHOICE

## 2021-10-15 RX ADMIN — NAPROXEN 500 MG: 250 TABLET ORAL at 16:30

## 2021-10-15 RX ADMIN — ONDANSETRON 4 MG: 4 TABLET, ORALLY DISINTEGRATING ORAL at 16:30

## 2021-10-15 ASSESSMENT — PAIN DESCRIPTION - PROGRESSION: CLINICAL_PROGRESSION: NOT CHANGED

## 2021-10-15 ASSESSMENT — PAIN DESCRIPTION - FREQUENCY: FREQUENCY: CONTINUOUS

## 2021-10-15 ASSESSMENT — PAIN DESCRIPTION - PAIN TYPE
TYPE: ACUTE PAIN
TYPE: CHRONIC PAIN;ACUTE PAIN

## 2021-10-15 ASSESSMENT — PAIN DESCRIPTION - DESCRIPTORS: DESCRIPTORS: ACHING

## 2021-10-15 ASSESSMENT — PAIN SCALES - GENERAL
PAINLEVEL_OUTOF10: 6
PAINLEVEL_OUTOF10: 6
PAINLEVEL_OUTOF10: 7

## 2021-10-15 ASSESSMENT — PAIN DESCRIPTION - LOCATION: LOCATION: HEAD

## 2021-10-15 ASSESSMENT — PAIN DESCRIPTION - ONSET: ONSET: GRADUAL

## 2021-10-15 ASSESSMENT — PAIN - FUNCTIONAL ASSESSMENT: PAIN_FUNCTIONAL_ASSESSMENT: 0-10

## 2021-10-15 NOTE — ED PROVIDER NOTES
[address]  Chief Complaint   Patient presents with    Headache     states headache started while at work      [address]  Liza Vaughan is a 27 y.o. female who presents to the ED complaining of ongoing headaches. She tells me that this is been actually going on for over a year with persistent headaches. She was seen at another hospital emergency department last week where she had a head CT showing incidental finding of prominent CSF space in the posterior fossa. Patient was referred to neurosurgery but is yet to actually see them. Her headaches however are frontal.  She denies any focal numbness tingling or weakness anywhere. She has some photosensitivity but no fevers or neck stiffness. She denies any confusion or trouble with speech or swallowing. She has had no vertigo or falls or syncope. She does feel lightheaded sometimes but not currently. No chest pain cough or shortness of breath or fevers. During her recent ED evaluation she was also negative for COVID-19.  2 weeks ago she was seen at [facility]. for abdominal pain unrelated as well as nausea and vomiting. She says she was prescribed Fioricet at the last week ER visit and this was a medicine that was helping her quite a bit. Headache is not thunderclap. She is not particularly worse or different than she was a week ago when she was seen at the other hospital.    No other complaints, modifying factors or associated symptoms. Nursing notes reviewed. Past Medical History:   Diagnosis Date    Asthma     Bronchitis      No past surgical history on file. No family history on file.   Social History     Socioeconomic History    Marital status: Single     Spouse name: Not on file    Number of children: Not on file    Years of education: Not on file    Highest education level: Not on file   Occupational History    Not on file   Tobacco Use    Smoking status: Former Smoker    Smokeless tobacco: Never Used   Vaping Use    Vaping Use: Never used   Substance and Sexual Activity    Alcohol use: Yes     Comment: occ    Drug use: Yes     Types: Marijuana    Sexual activity: Yes   Other Topics Concern    Not on file   Social History Narrative    Not on file     Social Determinants of Health     Financial Resource Strain:     Difficulty of Paying Living Expenses:    Food Insecurity:     Worried About Running Out of Food in the Last Year:     [address] in the Last Year:    Transportation Needs:     Lack of Transportation (Medical):      Lack of Transportation (Non-Medical):    Physical Activity:     Days of Exercise per Week:     Minutes of Exercise per Session:    Stress:     Feeling of Stress :    Social Connections:     Frequency of Communication with Friends and Family:     Frequency of Social Gatherings with Friends and Family:     Attends [employer]ist Services:     Active Member of Clubs or Organizations:     Attends Club or Organization Meetings:     Marital Status:    Intimate Partner Violence:     Fear of Current or Ex-Partner:     Emotionally Abused:     Physically Abused:     Sexually Abused:      Current Facility-Administered Medications   Medication Dose Route Frequency Provider Last Rate Last Admin    naproxen (NAPROSYN) tablet 500 mg  500 mg Oral Once Lia Moreland MD        ondansetron (ZOFRAN-ODT) disintegrating tablet 4 mg  4 mg Oral Once Lia Moreland MD         Current Outpatient Medications   Medication Sig Dispense Refill    butalbital-APAP-caffeine (FIORICET) -40 MG CAPS per capsule Take 1 capsule by mouth every 6 hours as needed for Headaches (CAUTION: Can cause dizziness, don't drive while taking.) 20 capsule 0    ondansetron (ZOFRAN ODT) 4 MG disintegrating tablet Take 1 tablet by mouth every 8 hours as needed for Nausea or Vomiting 20 tablet 0    promethazine (PHENERGAN) 25 MG tablet Take 1 tablet by mouth every 8 hours as needed for Nausea (CAUTION: Can cause dizziness, don't drive while taking.) 20 tablet 0    naproxen (NAPROSYN) 500 MG tablet Take 1 tablet by mouth 2 times daily as needed for Pain 60 tablet 0    acetaminophen (TYLENOL) 500 MG tablet Take 2 tablets by mouth 4 times daily as needed for Pain 60 tablet 0    albuterol sulfate HFA (VENTOLIN HFA) 108 (90 Base) MCG/ACT inhaler Inhale 2 puffs into the lungs 4 times daily as needed for Wheezing 1 Inhaler 0     No Known Allergies    REVIEW OF SYSTEMS  6 systems reviewed, pertinent positives per HPI otherwise noted to be negative    PHYSICAL EXAM   /77   Pulse 83   Temp 98.3 °F (36.8 °C) (Oral)   Resp 20   Ht 5' 3\" (1.6 m)   Wt 296 lb 4.8 oz (134.4 kg)   SpO2 97%   BMI 52.49 kg/m²    GENERAL APPEARANCE: Awake and alert. Cooperative. No acute distress. HEAD: Normocephalic. Atraumatic. EYES: PERRL. EOM's grossly intact. ENT: Mucous membranes are moist.  Oropharynx clear. NECK: Supple. Normal ROM. No meningismus  CHEST: Equal symmetric chest rise. Regular rate and rhythm  LUNGS: Breathing is unlabored. Speaking comfortably in full sentences. Clear to auscultation bilaterally  ABDOMEN: Nondistended, nontender  EXTREMITIES: MAEE. No acute deformities. SKIN: Warm and dry. No acute rashes. NEUROLOGICAL: Alert and oriented. Strength is 5/5 in all extremities and sensation is intact. Normal gait. Normal finger-nose-finger testing bilaterally. No ataxia or dysmetria aphasia or dysarthria. Cranial nerves II through XII are grossly intact. ED COURSE/MDM  Differential diagnosis considerations included: migraine, meningitis, subarachnoid hemorrhage, head injury/trauma, cluster headache, intracranial bleed/mass, stroke    The patient's ED course was notable for headache, somewhat chronic at this point but persistent over the past week in particular.   During her last ER visit she did have a head CT scan so do not feel inclined to repeat this given the lack of significant clinical differences in her headache quality or severity. Her nonfocal reassuring neurologic examination argues against a focal CNS process like stroke or bleed. She can continue to try to follow-up with neurosurgery as an outpatient as originally intended by the other hospital.  However I do think that the prominent CSF finding on the previous head CT is incidental and unrelated to the headaches as her headaches are mostly frontal.  She was prescribed naproxen, antiemetics and a refill of Fioricet for her symptoms for home. At this point do not feel that repeat diagnostics are indicated however she was cautioned to return to the ED for any new or worsening symptoms. Patient was given scripts for the following medications. I counseled patient how to take these medications. New Prescriptions    BUTALBITAL-APAP-CAFFEINE (FIORICET) -40 MG CAPS PER CAPSULE    Take 1 capsule by mouth every 6 hours as needed for Headaches (CAUTION: Can cause dizziness, don't drive while taking.)    NAPROXEN (NAPROSYN) 500 MG TABLET    Take 1 tablet by mouth 2 times daily as needed for Pain    ONDANSETRON (ZOFRAN ODT) 4 MG DISINTEGRATING TABLET    Take 1 tablet by mouth every 8 hours as needed for Nausea or Vomiting    PROMETHAZINE (PHENERGAN) 25 MG TABLET    Take 1 tablet by mouth every 8 hours as needed for Nausea (CAUTION: Can cause dizziness, don't drive while taking.)         CLINICAL IMPRESSION  1. Acute nonintractable headache, unspecified headache type        Blood pressure 128/77, pulse 83, temperature 98.3 °F (36.8 °C), temperature source Oral, resp. rate 20, height 5' 3\" (1.6 m), weight 296 lb 4.8 oz (134.4 kg), SpO2 97 %. DISPOSITION    I have discussed the findings of today's workup with the patient and addressed the patient's questions and concerns.   Important warning signs as well as new or worsening symptoms which would necessitate immediate return to the ED were discussed. The plan is to discharge from the ED at this time, and the patient is in stable condition. The patient acknowledged understanding is agreeable with this plan. Follow-up with:  Michelle Dumont  [place] [address]  [phone]  Go to   If symptoms worsen      This chart was created using Dragon dictation software. Efforts were made by me to ensure accuracy, however some errors may be present due to limitations of this technology.         Roshni Head MD  10/15/21 3636

## 2021-10-15 NOTE — ED NOTES
Pt d/c home with AVS no s.s of distress noted script x 4 sent into pharmacy , pt denies questions about f/u care gait steady at d.c      Geovany Israel RN  10/15/21 8787
Pt states that she has been having headaches on and off for over a year, she has been seen for headaches in the past . Most recently in the past week, she states today her headache started while she was at work today and there was a rumpke truck outside that was beeping for 15 mins straight . She reports that the headaches are behind her eyes, she has not had any syncope episodes , denies injury.       Erin Pulido RN  10/15/21 3019
no

## 2021-10-19 ENCOUNTER — VIRTUAL VISIT (OUTPATIENT)
Dept: FAMILY MEDICINE CLINIC | Age: 26
End: 2021-10-19

## 2021-10-19 DIAGNOSIS — G43.C0 PERIODIC HEADACHE SYNDROME, NOT INTRACTABLE: Primary | ICD-10-CM

## 2021-10-19 PROBLEM — M79.674 PAIN OF RIGHT GREAT TOE: Status: RESOLVED | Noted: 2020-07-07 | Resolved: 2021-10-19

## 2021-10-19 PROBLEM — Z23 NEED FOR TDAP VACCINATION: Status: RESOLVED | Noted: 2020-07-07 | Resolved: 2021-10-19

## 2021-10-19 PROCEDURE — 99214 OFFICE O/P EST MOD 30 MIN: CPT | Performed by: NURSE PRACTITIONER

## 2021-10-19 PROCEDURE — 1111F DSCHRG MED/CURRENT MED MERGE: CPT | Performed by: NURSE PRACTITIONER

## 2021-10-19 SDOH — ECONOMIC STABILITY: FOOD INSECURITY: WITHIN THE PAST 12 MONTHS, YOU WORRIED THAT YOUR FOOD WOULD RUN OUT BEFORE YOU GOT MONEY TO BUY MORE.: NEVER TRUE

## 2021-10-19 SDOH — ECONOMIC STABILITY: FOOD INSECURITY: WITHIN THE PAST 12 MONTHS, THE FOOD YOU BOUGHT JUST DIDN'T LAST AND YOU DIDN'T HAVE MONEY TO GET MORE.: NEVER TRUE

## 2021-10-19 ASSESSMENT — PATIENT HEALTH QUESTIONNAIRE - PHQ9
1. LITTLE INTEREST OR PLEASURE IN DOING THINGS: 0
2. FEELING DOWN, DEPRESSED OR HOPELESS: 0
SUM OF ALL RESPONSES TO PHQ QUESTIONS 1-9: 0
SUM OF ALL RESPONSES TO PHQ9 QUESTIONS 1 & 2: 0

## 2021-10-19 ASSESSMENT — SOCIAL DETERMINANTS OF HEALTH (SDOH): HOW HARD IS IT FOR YOU TO PAY FOR THE VERY BASICS LIKE FOOD, HOUSING, MEDICAL CARE, AND HEATING?: NOT HARD AT ALL

## 2021-10-19 NOTE — PROGRESS NOTES
10/19/2021    TELEHEALTH EVALUATION -- Audio/Visual (During QNPPB-22 public health emergency)    HPI:    Norma Fregoso (:  1995) has requested an audio/video evaluation for the following concern(s):  Chronic intractable headaches since 2020  Behind eyes and pressure  +phono and photophobia  Pain makes her black out or get dizzy- otherwise no neuro deficit  Seen in ED last week. CT completed- incidental finding Prominent CSF space posterior fossa  Was unable to get into [facility] by referral  Has been taking Fioricet without much relief    Review of Systems   All other systems reviewed and are negative. Prior to Visit Medications    Medication Sig Taking? Authorizing Provider   butalbital-APAP-caffeine (FIORICET) -40 MG CAPS per capsule Take 1 capsule by mouth every 6 hours as needed for Headaches (CAUTION: Can cause dizziness, don't drive while taking.) Yes Paige Roman MD   ibuprofen (IBU) 800 MG tablet Take 1 tablet by mouth every 8 hours as needed for Pain  Charlene Montiel, APRN - CNP       Social History     Tobacco Use    Smoking status: Former Smoker    Smokeless tobacco: Never Used   Vaping Use    Vaping Use: Never used   Substance Use Topics    Alcohol use: Yes     Comment: occ    Drug use: Yes     Types: Marijuana        Past Medical History:   Diagnosis Date    Asthma     Bronchitis        No past surgical history on file. PHYSICAL EXAMINATION:  [ INSTRUCTIONS:  \"[x]\" Indicates a positive item  \"[]\" Indicates a negative item  -- DELETE ALL ITEMS NOT EXAMINED]  Vital Signs: (As obtained by patient/caregiver or practitioner observation)    Blood pressure-  Heart rate-    Respiratory rate-    Temperature-  Pulse oximetry-     Constitutional: [x] Appears well-developed and well-nourished [x] No apparent distress  Clear speech, no reported deficit, working.     [] Abnormal-   Mental status  [x] Alert and awake  [x] Oriented to person/place/time [x]Able to follow commands      Eyes:  EOM    []  Normal  [] Abnormal-  Sclera  []  Normal  [] Abnormal -         Discharge []  None visible  [] Abnormal -    HENT:   [] Normocephalic, atraumatic. [] Abnormal   [] Mouth/Throat: Mucous membranes are moist.     External Ears [] Normal  [] Abnormal-     Neck: [x] No visualized mass     Pulmonary/Chest: [x] Respiratory effort normal.  [x] No audible signs of difficulty breathing or respiratory distress        [] Abnormal-      Musculoskeletal:   [] Normal gait with no signs of ataxia         [] Normal range of motion of neck        [] Abnormal-       Neurological:        [] No Facial Asymmetry (Cranial nerve 7 motor function) (limited exam to video visit)          [] No gaze palsy        [] Abnormal-         Skin:        [] No significant exanthematous lesions or discoloration noted on facial skin         [] Abnormal-            Psychiatric:       [x] Normal Affect [x] No Hallucinations        [] Abnormal-     Other pertinent observable physical exam findings-     ASSESSMENT/PLAN:   Diagnosis Orders   1. Periodic headache syndrome, not intractable  LA DISCHARGE MEDS RECONCILED W/ CURRENT OUTPATIENT MED LIST     Periodic headache syndrome, not intractable  Migraine? Will provide sample Nurtec  She is to call [facility] again for appt- provide results of CT  F/U as needed    No follow-ups on file. Leah Frey is a 27 y.o. female being evaluated by a Virtual Visit (video visit) encounter to address concerns as mentioned above. A caregiver was present when appropriate. Due to this being a TeleHealth encounter (During MDLNX-84 public health emergency), evaluation of the following organ systems was limited: Vitals/Constitutional/EENT/Resp/CV/GI//MS/Neuro/Skin/Heme-Lymph-Imm.   Pursuant to the emergency declaration under the [address] [place] [address], [address] [place] [address] authority and the [employer] Act, this Virtual Visit was conducted with patient's (and/or legal guardian's) consent, to reduce the patient's risk of exposure to COVID-19 and provide necessary medical care. The patient (and/or legal guardian) has also been advised to contact this office for worsening conditions or problems, and seek emergency medical treatment and/or call 911 if deemed necessary. Patient identification was verified at the start of the visit: yes    Total time spent on this encounter: not billed by time    Services were provided through a video synchronous discussion virtually to substitute for in-person clinic visit. Patient and provider were located at their individual homes. --ASHOK Blake - CNP on 10/19/2021 at 2:59 PM    An electronic signature was used to authenticate this note.

## 2021-10-19 NOTE — ASSESSMENT & PLAN NOTE
Migraine?   Will provide sample [facility]  She is to call [facility] again for appt- provide results of CT  F/U as needed

## 2021-10-25 ENCOUNTER — HOSPITAL ENCOUNTER (EMERGENCY)
Age: 26
Discharge: HOME | End: 2021-10-25
Attending: EMERGENCY MEDICINE

## 2021-10-25 VITALS
SYSTOLIC BLOOD PRESSURE: 134 MMHG | HEIGHT: 63 IN | DIASTOLIC BLOOD PRESSURE: 74 MMHG | WEIGHT: 291.01 LBS | HEART RATE: 83 BPM | RESPIRATION RATE: 18 BRPM | BODY MASS INDEX: 51.56 KG/M2 | TEMPERATURE: 99.2 F | OXYGEN SATURATION: 97 %

## 2021-10-25 DIAGNOSIS — R51.9 NONINTRACTABLE EPISODIC HEADACHE, UNSPECIFIED HEADACHE TYPE: Primary | ICD-10-CM

## 2021-10-25 PROCEDURE — 6360000002 HC RX W HCPCS: Performed by: EMERGENCY MEDICINE

## 2021-10-25 PROCEDURE — 99282 EMERGENCY DEPT VISIT SF MDM: CPT

## 2021-10-25 PROCEDURE — 96372 THER/PROPH/DIAG INJ SC/IM: CPT

## 2021-10-25 RX ORDER — DIPHENHYDRAMINE HYDROCHLORIDE 50 MG/ML
25 INJECTION INTRAMUSCULAR; INTRAVENOUS ONCE
Status: COMPLETED | OUTPATIENT
Start: 2021-10-25 | End: 2021-10-25

## 2021-10-25 RX ORDER — KETOROLAC TROMETHAMINE 30 MG/ML
30 INJECTION, SOLUTION INTRAMUSCULAR; INTRAVENOUS ONCE
Status: COMPLETED | OUTPATIENT
Start: 2021-10-25 | End: 2021-10-25

## 2021-10-25 RX ORDER — PROMETHAZINE HYDROCHLORIDE 25 MG/ML
25 INJECTION, SOLUTION INTRAMUSCULAR; INTRAVENOUS ONCE
Status: COMPLETED | OUTPATIENT
Start: 2021-10-25 | End: 2021-10-25

## 2021-10-25 RX ADMIN — DIPHENHYDRAMINE HYDROCHLORIDE 25 MG: 50 INJECTION, SOLUTION INTRAMUSCULAR; INTRAVENOUS at 09:57

## 2021-10-25 RX ADMIN — PROMETHAZINE HYDROCHLORIDE 25 MG: 25 INJECTION INTRAMUSCULAR; INTRAVENOUS at 09:55

## 2021-10-25 RX ADMIN — KETOROLAC TROMETHAMINE 30 MG: 30 INJECTION, SOLUTION INTRAMUSCULAR at 09:54

## 2021-10-25 ASSESSMENT — PAIN DESCRIPTION - ORIENTATION: ORIENTATION: ANTERIOR

## 2021-10-25 ASSESSMENT — PAIN DESCRIPTION - PAIN TYPE: TYPE: ACUTE PAIN

## 2021-10-25 ASSESSMENT — PAIN SCALES - GENERAL
PAINLEVEL_OUTOF10: 10
PAINLEVEL_OUTOF10: 10

## 2021-10-25 ASSESSMENT — PAIN DESCRIPTION - LOCATION: LOCATION: HEAD

## 2021-10-25 NOTE — ED PROVIDER NOTES
eMERGENCY dEPARTMENT eNCOUnter      Pt Name: Loyd Koyanagi  MRN: [id number]  Armsyanagfclarke 1995  Date of evaluation: 10/25/2021  Provider: Armaan Quintero MD     [address] [place] [address]       Chief Complaint   Patient presents with    Headache         HISTORY OF PRESENT ILLNESS   (Location/Symptom, Timing/Onset,Context/Setting, Quality, Duration, Modifying Factors, Severity) Note limiting factors. HPI    Loyd Koyanagi is a 27 y.o. female who presents to the emergency department with a headache diffuse on and off for the past month or so. Patient has 3 visits here in the past month for the same thing. Has been treated. Because of the Depo headache for her. It started again today. Patient denies any fever no neck pain. There was no injury. Nursing Notes were reviewed. REVIEW OFSYSTEMS    (2+ for level 4; 10+ for level 5)   Review of Systems    General: No fevers, chills or night sweats, No weight loss    Head:  No Sore throat,  No Ear Pain. Positive headache    Chest:  Nontender. No Cough, No SOB,  Chest Pain    GI: No abdominal pain or vomiting    : No dysuria or hematuria    Musculoskeletal: No unrelenting pain or night pain    Neurologic: No bowel or bladder incontinence, No saddle anesthesia, No leg weakness    All other systems reviewed and are negative. PAST MEDICAL HISTORY     Past Medical History:   Diagnosis Date    Asthma     Bronchitis        SURGICAL HISTORY     No past surgical history on file. CURRENT MEDICATIONS       Discharge Medication List as of 10/25/2021  1:04 PM      CONTINUE these medications which have NOT CHANGED    Details   butalbital-APAP-caffeine (FIORICET) -40 MG CAPS per capsule Take 1 capsule by mouth every 6 hours as needed for Headaches (CAUTION: Can cause dizziness, don't drive while taking.), Disp-20 capsule, R-0Normal             ALLERGIES     Patient has no known allergies. FAMILY HISTORY     No family history on file.      SOCIAL HISTORY Social History     Socioeconomic History    Marital status: Single     Spouse name: Not on file    Number of children: Not on file    Years of education: Not on file    Highest education level: Not on file   Occupational History    Not on file   Tobacco Use    Smoking status: Former Smoker    Smokeless tobacco: Never Used   Vaping Use    Vaping Use: Never used   Substance and Sexual Activity    Alcohol use: Yes     Comment: occ    Drug use: Yes     Types: Marijuana    Sexual activity: Yes   Other Topics Concern    Not on file   Social History Narrative    Not on file     Social Determinants of Health     Financial Resource Strain: Low Risk     Difficulty of Paying Living Expenses: Not hard at all   Food Insecurity: No Food Insecurity    Worried About [address] in the Last Year: Never true    [address] in the Last Year: Never true   Transportation Needs:     Lack of Transportation (Medical):  Lack of Transportation (Non-Medical):    Physical Activity:     Days of Exercise per Week:     Minutes of Exercise per Session:    Stress:     Feeling of Stress :    Social Connections:     Frequency of Communication with Friends and Family:     Frequency of Social Gatherings with Friends and Family:     Attends Jew Services:     Active Member of Clubs or Organizations:     Attends Club or Organization Meetings:     Marital Status:    Intimate Partner Violence:     Fear of Current or Ex-Partner:     Emotionally Abused:     Physically Abused:     Sexually Abused:        SCREENINGS           PHYSICAL EXAM    (up to 7 for level 4, 8 or more for level 5)     ED Triage Vitals   BP Temp Temp src Pulse Resp SpO2 Height Weight   -- -- -- -- -- -- -- --       Physical Exam    General: Alert and awake ×3. Nontoxic appearance. Well-developed well-nourished 27-year-old obese black female no distress  HEENT: Normocephalic atraumatic. Neck is supple. Airway intact.   No reportable procedures. There was a high probability of clinically significant/life threatening deterioration in the patient's condition which required my urgent intervention. CONSULTS:  None    PROCEDURES:  Unless otherwise noted below, none     [unfilled]    FINAL IMPRESSION      1. Nonintractable episodic headache, unspecified headache type          DISPOSITION/PLAN   DISPOSITION        PATIENT REFERRED TO:  Family physician    Schedule an appointment as soon as possible for a visit in 1 week  If symptoms worsen      DISCHARGE MEDICATIONS:  Discharge Medication List as of 10/25/2021  1:04 PM             (Please note:  Portions of this note were completed with a voice recognition program.Efforts were made to edit the dictations but occasionally words and phrases are mis-transcribed.)  Form v2016. J.5-cn    ANG STINSON MD (electronically signed)  Emergency Medicine Provider        Kamaljit Porras MD  10/25/21 2235

## 2021-11-01 ENCOUNTER — HOSPITAL ENCOUNTER (INPATIENT)
Age: 26
LOS: 7 days | Discharge: HOME | DRG: 871 | End: 2021-11-08
Attending: EMERGENCY MEDICINE | Admitting: INTERNAL MEDICINE

## 2021-11-01 ENCOUNTER — APPOINTMENT (OUTPATIENT)
Dept: GENERAL RADIOLOGY | Age: 26
DRG: 871 | End: 2021-11-01

## 2021-11-01 ENCOUNTER — APPOINTMENT (OUTPATIENT)
Dept: CT IMAGING | Age: 26
DRG: 871 | End: 2021-11-01

## 2021-11-01 DIAGNOSIS — J45.20 MILD INTERMITTENT ASTHMA WITHOUT COMPLICATION: ICD-10-CM

## 2021-11-01 DIAGNOSIS — R06.00 DYSPNEA AND RESPIRATORY ABNORMALITIES: ICD-10-CM

## 2021-11-01 DIAGNOSIS — J12.82 PNEUMONIA DUE TO COVID-19 VIRUS: ICD-10-CM

## 2021-11-01 DIAGNOSIS — A41.89 VIRAL SEPSIS (HCC): Primary | ICD-10-CM

## 2021-11-01 DIAGNOSIS — J96.01 ACUTE RESPIRATORY FAILURE WITH HYPOXIA (HCC): ICD-10-CM

## 2021-11-01 DIAGNOSIS — E11.9 TYPE 2 DIABETES, HBA1C GOAL < 7% (HCC): ICD-10-CM

## 2021-11-01 DIAGNOSIS — R06.89 DYSPNEA AND RESPIRATORY ABNORMALITIES: ICD-10-CM

## 2021-11-01 DIAGNOSIS — B97.89 VIRAL SEPSIS (HCC): Primary | ICD-10-CM

## 2021-11-01 DIAGNOSIS — U07.1 PNEUMONIA DUE TO COVID-19 VIRUS: ICD-10-CM

## 2021-11-01 LAB
A/G RATIO: 1.4 (ref 1.1–2.2)
ALBUMIN SERPL-MCNC: 4.4 G/DL (ref 3.4–5)
ALP BLD-CCNC: 43 U/L (ref 40–129)
ALT SERPL-CCNC: 29 U/L (ref 10–40)
ANION GAP SERPL CALCULATED.3IONS-SCNC: 15 MMOL/L (ref 3–16)
AST SERPL-CCNC: 45 U/L (ref 15–37)
BASOPHILS ABSOLUTE: 0 K/UL (ref 0–0.2)
BASOPHILS RELATIVE PERCENT: 0.5 %
BILIRUB SERPL-MCNC: 0.3 MG/DL (ref 0–1)
BUN BLDV-MCNC: 7 MG/DL (ref 7–20)
C-REACTIVE PROTEIN: 63.7 MG/L (ref 0–5.1)
CALCIUM SERPL-MCNC: 8.6 MG/DL (ref 8.3–10.6)
CHLORIDE BLD-SCNC: 98 MMOL/L (ref 99–110)
CO2: 21 MMOL/L (ref 21–32)
CREAT SERPL-MCNC: 0.7 MG/DL (ref 0.6–1.1)
D DIMER: 282 NG/ML DDU (ref 0–229)
EKG ATRIAL RATE: 124 BPM
EKG DIAGNOSIS: NORMAL
EKG P AXIS: 53 DEGREES
EKG P-R INTERVAL: 168 MS
EKG Q-T INTERVAL: 290 MS
EKG QRS DURATION: 74 MS
EKG QTC CALCULATION (BAZETT): 416 MS
EKG R AXIS: 42 DEGREES
EKG T AXIS: 15 DEGREES
EKG VENTRICULAR RATE: 124 BPM
EOSINOPHILS ABSOLUTE: 0 K/UL (ref 0–0.6)
EOSINOPHILS RELATIVE PERCENT: 0 %
GFR AFRICAN AMERICAN: >60
GFR NON-AFRICAN AMERICAN: >60
GLUCOSE BLD-MCNC: 131 MG/DL (ref 70–99)
GLUCOSE BLD-MCNC: 149 MG/DL (ref 70–99)
HCG QUALITATIVE: NEGATIVE
HCT VFR BLD CALC: 43.8 % (ref 36–48)
HEMOGLOBIN: 14.5 G/DL (ref 12–16)
LYMPHOCYTES ABSOLUTE: 0.9 K/UL (ref 1–5.1)
LYMPHOCYTES RELATIVE PERCENT: 15 %
MCH RBC QN AUTO: 26.8 PG (ref 26–34)
MCHC RBC AUTO-ENTMCNC: 33 G/DL (ref 31–36)
MCV RBC AUTO: 81.3 FL (ref 80–100)
MONOCYTES ABSOLUTE: 0.3 K/UL (ref 0–1.3)
MONOCYTES RELATIVE PERCENT: 4.9 %
NEUTROPHILS ABSOLUTE: 4.8 K/UL (ref 1.7–7.7)
NEUTROPHILS RELATIVE PERCENT: 79.6 %
PDW BLD-RTO: 14.4 % (ref 12.4–15.4)
PERFORMED ON: ABNORMAL
PLATELET # BLD: 188 K/UL (ref 135–450)
PMV BLD AUTO: 9 FL (ref 5–10.5)
POTASSIUM SERPL-SCNC: 3.7 MMOL/L (ref 3.5–5.1)
PROCALCITONIN: 0.08 NG/ML (ref 0–0.15)
RAPID INFLUENZA  B AGN: NEGATIVE
RAPID INFLUENZA A AGN: NEGATIVE
RBC # BLD: 5.39 M/UL (ref 4–5.2)
SODIUM BLD-SCNC: 134 MMOL/L (ref 136–145)
TOTAL PROTEIN: 7.6 G/DL (ref 6.4–8.2)
TROPONIN: <0.01 NG/ML
WBC # BLD: 6 K/UL (ref 4–11)

## 2021-11-01 PROCEDURE — 36415 COLL VENOUS BLD VENIPUNCTURE: CPT

## 2021-11-01 PROCEDURE — 94761 N-INVAS EAR/PLS OXIMETRY MLT: CPT

## 2021-11-01 PROCEDURE — 85025 COMPLETE CBC W/AUTO DIFF WBC: CPT

## 2021-11-01 PROCEDURE — 80053 COMPREHEN METABOLIC PANEL: CPT

## 2021-11-01 PROCEDURE — 6370000000 HC RX 637 (ALT 250 FOR IP): Performed by: EMERGENCY MEDICINE

## 2021-11-01 PROCEDURE — 2580000003 HC RX 258: Performed by: INTERNAL MEDICINE

## 2021-11-01 PROCEDURE — 87804 INFLUENZA ASSAY W/OPTIC: CPT

## 2021-11-01 PROCEDURE — XW033H5 INTRODUCTION OF TOCILIZUMAB INTO PERIPHERAL VEIN, PERCUTANEOUS APPROACH, NEW TECHNOLOGY GROUP 5: ICD-10-PCS | Performed by: INTERNAL MEDICINE

## 2021-11-01 PROCEDURE — 99285 EMERGENCY DEPT VISIT HI MDM: CPT

## 2021-11-01 PROCEDURE — 86140 C-REACTIVE PROTEIN: CPT

## 2021-11-01 PROCEDURE — 2580000003 HC RX 258: Performed by: EMERGENCY MEDICINE

## 2021-11-01 PROCEDURE — XW0DXM6 INTRODUCTION OF BARICITINIB INTO MOUTH AND PHARYNX, EXTERNAL APPROACH, NEW TECHNOLOGY GROUP 6: ICD-10-PCS | Performed by: INTERNAL MEDICINE

## 2021-11-01 PROCEDURE — 6370000000 HC RX 637 (ALT 250 FOR IP): Performed by: INTERNAL MEDICINE

## 2021-11-01 PROCEDURE — 6360000002 HC RX W HCPCS: Performed by: INTERNAL MEDICINE

## 2021-11-01 PROCEDURE — 84145 PROCALCITONIN (PCT): CPT

## 2021-11-01 PROCEDURE — 83036 HEMOGLOBIN GLYCOSYLATED A1C: CPT

## 2021-11-01 PROCEDURE — 6370000000 HC RX 637 (ALT 250 FOR IP): Performed by: NURSE PRACTITIONER

## 2021-11-01 PROCEDURE — 93005 ELECTROCARDIOGRAM TRACING: CPT | Performed by: EMERGENCY MEDICINE

## 2021-11-01 PROCEDURE — 85379 FIBRIN DEGRADATION QUANT: CPT

## 2021-11-01 PROCEDURE — 6360000004 HC RX CONTRAST MEDICATION: Performed by: EMERGENCY MEDICINE

## 2021-11-01 PROCEDURE — 1200000000 HC SEMI PRIVATE

## 2021-11-01 PROCEDURE — 71046 X-RAY EXAM CHEST 2 VIEWS: CPT

## 2021-11-01 PROCEDURE — 71260 CT THORAX DX C+: CPT

## 2021-11-01 PROCEDURE — 6360000002 HC RX W HCPCS: Performed by: EMERGENCY MEDICINE

## 2021-11-01 PROCEDURE — 84703 CHORIONIC GONADOTROPIN ASSAY: CPT

## 2021-11-01 PROCEDURE — 84484 ASSAY OF TROPONIN QUANT: CPT

## 2021-11-01 PROCEDURE — 94664 DEMO&/EVAL PT USE INHALER: CPT

## 2021-11-01 PROCEDURE — 94640 AIRWAY INHALATION TREATMENT: CPT

## 2021-11-01 PROCEDURE — 96374 THER/PROPH/DIAG INJ IV PUSH: CPT

## 2021-11-01 PROCEDURE — 93010 ELECTROCARDIOGRAM REPORT: CPT | Performed by: INTERNAL MEDICINE

## 2021-11-01 RX ORDER — ONDANSETRON 4 MG/1
4 TABLET, ORALLY DISINTEGRATING ORAL EVERY 8 HOURS PRN
Status: DISCONTINUED | OUTPATIENT
Start: 2021-11-01 | End: 2021-11-08 | Stop reason: HOSPADM

## 2021-11-01 RX ORDER — ONDANSETRON 2 MG/ML
4 INJECTION INTRAMUSCULAR; INTRAVENOUS EVERY 6 HOURS PRN
Status: DISCONTINUED | OUTPATIENT
Start: 2021-11-01 | End: 2021-11-08 | Stop reason: HOSPADM

## 2021-11-01 RX ORDER — ACETAMINOPHEN 325 MG/1
650 TABLET ORAL EVERY 6 HOURS PRN
Status: DISCONTINUED | OUTPATIENT
Start: 2021-11-01 | End: 2021-11-08 | Stop reason: HOSPADM

## 2021-11-01 RX ORDER — SODIUM CHLORIDE 0.9 % (FLUSH) 0.9 %
5-40 SYRINGE (ML) INJECTION EVERY 12 HOURS SCHEDULED
Status: DISCONTINUED | OUTPATIENT
Start: 2021-11-01 | End: 2021-11-08 | Stop reason: HOSPADM

## 2021-11-01 RX ORDER — ALBUTEROL SULFATE 90 UG/1
2 AEROSOL, METERED RESPIRATORY (INHALATION) EVERY 6 HOURS PRN
Status: DISCONTINUED | OUTPATIENT
Start: 2021-11-01 | End: 2021-11-02

## 2021-11-01 RX ORDER — ACETAMINOPHEN 650 MG/1
650 SUPPOSITORY RECTAL EVERY 6 HOURS PRN
Status: DISCONTINUED | OUTPATIENT
Start: 2021-11-01 | End: 2021-11-08 | Stop reason: HOSPADM

## 2021-11-01 RX ORDER — DEXAMETHASONE SODIUM PHOSPHATE 10 MG/ML
10 INJECTION INTRAMUSCULAR; INTRAVENOUS EVERY 24 HOURS
Status: DISCONTINUED | OUTPATIENT
Start: 2021-11-06 | End: 2021-11-08 | Stop reason: HOSPADM

## 2021-11-01 RX ORDER — 0.9 % SODIUM CHLORIDE 0.9 %
30 INTRAVENOUS SOLUTION INTRAVENOUS ONCE
Status: COMPLETED | OUTPATIENT
Start: 2021-11-01 | End: 2021-11-01

## 2021-11-01 RX ORDER — SODIUM CHLORIDE 0.9 % (FLUSH) 0.9 %
5-40 SYRINGE (ML) INJECTION PRN
Status: DISCONTINUED | OUTPATIENT
Start: 2021-11-01 | End: 2021-11-08 | Stop reason: HOSPADM

## 2021-11-01 RX ORDER — DEXAMETHASONE SODIUM PHOSPHATE 4 MG/ML
10 INJECTION, SOLUTION INTRA-ARTICULAR; INTRALESIONAL; INTRAMUSCULAR; INTRAVENOUS; SOFT TISSUE ONCE
Status: COMPLETED | OUTPATIENT
Start: 2021-11-01 | End: 2021-11-01

## 2021-11-01 RX ORDER — ACETAMINOPHEN 500 MG
1000 TABLET ORAL ONCE
Status: COMPLETED | OUTPATIENT
Start: 2021-11-01 | End: 2021-11-01

## 2021-11-01 RX ORDER — BENZONATATE 100 MG/1
100 CAPSULE ORAL EVERY 6 HOURS PRN
Status: DISCONTINUED | OUTPATIENT
Start: 2021-11-01 | End: 2021-11-08 | Stop reason: HOSPADM

## 2021-11-01 RX ORDER — ALBUTEROL SULFATE 90 UG/1
2 AEROSOL, METERED RESPIRATORY (INHALATION) ONCE
Status: COMPLETED | OUTPATIENT
Start: 2021-11-01 | End: 2021-11-01

## 2021-11-01 RX ORDER — ASPIRIN 81 MG/1
81 TABLET, CHEWABLE ORAL DAILY
Status: DISCONTINUED | OUTPATIENT
Start: 2021-11-01 | End: 2021-11-08 | Stop reason: HOSPADM

## 2021-11-01 RX ORDER — IPRATROPIUM BROMIDE AND ALBUTEROL SULFATE 2.5; .5 MG/3ML; MG/3ML
1 SOLUTION RESPIRATORY (INHALATION) ONCE
Status: DISCONTINUED | OUTPATIENT
Start: 2021-11-01 | End: 2021-11-01

## 2021-11-01 RX ORDER — SODIUM CHLORIDE 9 MG/ML
25 INJECTION, SOLUTION INTRAVENOUS PRN
Status: DISCONTINUED | OUTPATIENT
Start: 2021-11-01 | End: 2021-11-08 | Stop reason: HOSPADM

## 2021-11-01 RX ORDER — VITAMIN B COMPLEX
2000 TABLET ORAL DAILY
Status: DISCONTINUED | OUTPATIENT
Start: 2021-11-01 | End: 2021-11-08 | Stop reason: HOSPADM

## 2021-11-01 RX ORDER — ACETAMINOPHEN 500 MG
1000 TABLET ORAL EVERY 6 HOURS PRN
COMMUNITY

## 2021-11-01 RX ADMIN — IOPAMIDOL 75 ML: 755 INJECTION, SOLUTION INTRAVENOUS at 04:03

## 2021-11-01 RX ADMIN — SODIUM CHLORIDE 1572 ML: 9 INJECTION, SOLUTION INTRAVENOUS at 04:49

## 2021-11-01 RX ADMIN — ALBUTEROL SULFATE 2 PUFF: 90 AEROSOL, METERED RESPIRATORY (INHALATION) at 03:45

## 2021-11-01 RX ADMIN — SODIUM CHLORIDE, PRESERVATIVE FREE 10 ML: 5 INJECTION INTRAVENOUS at 21:04

## 2021-11-01 RX ADMIN — DEXAMETHASONE SODIUM PHOSPHATE 10 MG: 4 INJECTION, SOLUTION INTRAMUSCULAR; INTRAVENOUS at 03:30

## 2021-11-01 RX ADMIN — ACETAMINOPHEN 1000 MG: 500 TABLET ORAL at 03:27

## 2021-11-01 RX ADMIN — Medication 2000 UNITS: at 08:22

## 2021-11-01 RX ADMIN — ENOXAPARIN SODIUM 40 MG: 100 INJECTION SUBCUTANEOUS at 21:04

## 2021-11-01 RX ADMIN — IBUPROFEN 600 MG: 400 TABLET ORAL at 03:27

## 2021-11-01 RX ADMIN — ASPIRIN 81 MG: 81 TABLET, CHEWABLE ORAL at 08:22

## 2021-11-01 RX ADMIN — IPRATROPIUM BROMIDE 2 PUFF: 17 AEROSOL, METERED RESPIRATORY (INHALATION) at 03:45

## 2021-11-01 RX ADMIN — ENOXAPARIN SODIUM 40 MG: 100 INJECTION SUBCUTANEOUS at 08:22

## 2021-11-01 RX ADMIN — Medication 2 PUFF: at 20:48

## 2021-11-01 ASSESSMENT — PAIN DESCRIPTION - LOCATION: LOCATION: CHEST

## 2021-11-01 ASSESSMENT — PAIN SCALES - GENERAL
PAINLEVEL_OUTOF10: 8
PAINLEVEL_OUTOF10: 0
PAINLEVEL_OUTOF10: 5
PAINLEVEL_OUTOF10: 10
PAINLEVEL_OUTOF10: 0

## 2021-11-01 ASSESSMENT — PAIN DESCRIPTION - PAIN TYPE
TYPE: ACUTE PAIN
TYPE: ACUTE PAIN

## 2021-11-01 ASSESSMENT — PAIN DESCRIPTION - PROGRESSION: CLINICAL_PROGRESSION: GRADUALLY WORSENING

## 2021-11-01 ASSESSMENT — PAIN - FUNCTIONAL ASSESSMENT: PAIN_FUNCTIONAL_ASSESSMENT: PREVENTS OR INTERFERES SOME ACTIVE ACTIVITIES AND ADLS

## 2021-11-01 ASSESSMENT — PAIN DESCRIPTION - FREQUENCY
FREQUENCY: CONTINUOUS
FREQUENCY: CONTINUOUS

## 2021-11-01 ASSESSMENT — PAIN DESCRIPTION - DESCRIPTORS
DESCRIPTORS: ACHING
DESCRIPTORS: ACHING

## 2021-11-01 ASSESSMENT — PAIN DESCRIPTION - ONSET: ONSET: PROGRESSIVE

## 2021-11-01 NOTE — PROGRESS NOTES
4 Eyes Skin Assessment     NAME:  Sami Alanis OF BIRTH:  1995  MEDICAL RECORD NUMBER:  [id number]    The patient is being assess for  Admission    I agree that 2 RN's have performed a thorough Head to Toe Skin Assessment on the patient. ALL assessment sites listed below have been assessed. Areas assessed by both nurses:    Head, Face, Ears, Shoulders, Back, Chest, Arms, Elbows, Hands, Sacrum. Buttock, Coccyx, Ischium and Legs. Feet and Heels        Does the Patient have a Wound?  No noted wound(s)       Chepe Prevention initiated:  No   Wound Care Orders initiated:  No    Pressure Injury (Stage 3,4, Unstageable, DTI, NWPT, and Complex wounds) if present place consult order under [de-identified] No    New and Established Ostomies if present place consult order under : No      Nurse 1 eSignature: Electronically signed by Jonas Ward RN on 11/1/21 at 6:41 AM EDT    **SHARE this note so that the co-signing nurse is able to place an eSignature**    Nurse 2 eSignature: Electronically signed by Emiliano Cowart RN on 11/2/21 at 10:45 AM EDT

## 2021-11-01 NOTE — CARE COORDINATION
DISCHARGE PLAN: Pt plans to return home with her family at d/c. Pts father will transport her home at d/c. No d/c needs noted at this time. ___________________________________  INITIAL ASSESSMENT  Spoke w/pt to address barriers to dc. HOME: Pt reported that she resides with her fiance, her fiances mother and grandmother, and her mother and teenage sister in a single family home. There are 10 JOE the home. Disease Specific: COVID +    COVID Vaccination: No    DME/O2: None. No DME needs noted at this time. ACTIVE SERVICES: pt reported that she was working full time at [employer] and was independent with all self care. Pt denied the need for any assistance upon return home but stated that if she should need any assistance, she has great family support. TRANSPORTATION: Pt is an active  and stated that her father will transport her home at d/c. PHARMACY: Denies difficulty obtaining/taking meds. Pt stated that she takes no routine medications but when she has to take medications, she is able to afford them. Pt has medications filled at [facility]. PCP: ASHOK Paula    DEMOGRAPHICS: Verified address/phone number as correct    INSURANCE:  None-Pt reported that she is over income for Medicaid. Pt works full time at [employer] and is unsure if they offer medical insurance. PRESCRIPTION COVERAGE: None-Over income for Medicaid    HD/PD: No    THERAPY RECS not ordered    Discharge planning team will remain available for needs. Please consult for any specifics not addressed in this note.     Aditi Klein [place]  [phone]  Electronically signed by Dylon Hermosillo on 11/1/2021 at 9:37 AM

## 2021-11-01 NOTE — ACP (ADVANCE CARE PLANNING)
Advance Care Planning     Advance Care Planning Activator (Inpatient)  Conversation Note      Date of ACP Conversation: 11/1/2021     Conversation Conducted with: Patient with Decision Making Capacity    ACP Activator: [address]:     Current Designated Health Care Decision Maker:     Primary Decision Maker: Sharla Andersen - Parent - [phone]    Today we documented Decision Maker(s) consistent with Legal Next of Kin hierarchy. Care Preferences    Ventilation: \"If you were in your present state of health and suddenly became very ill and were unable to breathe on your own, what would your preference be about the use of a ventilator (breathing machine) if it were available to you? \"      Would the patient desire the use of ventilator (breathing machine)?: yes    \"If your health worsens and it becomes clear that your chance of recovery is unlikely, what would your preference be about the use of a ventilator (breathing machine) if it were available to you? \"     Would the patient desire the use of ventilator (breathing machine)?: Yes      Resuscitation  \"CPR works best to restart the heart when there is a sudden event, like a heart attack, in someone who is otherwise healthy. Unfortunately, CPR does not typically restart the heart for people who have serious health conditions or who are very sick. \"    \"In the event your heart stopped as a result of an underlying serious health condition, would you want attempts to be made to restart your heart (answer \"yes\" for attempt to resuscitate) or would you prefer a natural death (answer \"no\" for do not attempt to resuscitate)? \" yes       [] Yes   [x] No   Educated Patient / Brian Yudith regarding differences between Advance Directives and portable DNR orders.     Length of ACP Conversation in minutes:  5  Conversation Outcomes:  [x] ACP discussion completed  [] Existing advance directive reviewed with patient; no changes to patient's

## 2021-11-01 NOTE — ED PROVIDER NOTES
[address]      Pt Name: Colleen Reyna  MRN: [id number]  Arm[place]clarke 1995  Date of evaluation: 11/1/2021  Provider: Polo An DO    CHIEF COMPLAINT  History given by: PATIENT   Chief Complaint   Patient presents with    Shortness of Breath       I wore personal protective equipment when I was in the room the entire time. This includes gloves, N95 mask, face shield, and a glove over my stethoscope for protection. HPI  Colleen Reyna is a 27 y.o. female who presents with cough with yellow sputum been present for few days. She was recently diagnosed with Covid. She is gotten progressively worse and more short of breath. She admits to fevers and chills. Her temperature today was 102 at home. She states she is having backaches. She states she lost her taste and smell. She states she feels dyspneic at rest.  She denies sore throat. She has had a runny nose. She states that other family members have been diagnosed with Covid also. That is where she got her infection. ? REVIEW OF SYSTEMS  All systems negative except as marked. Reviewed Nurses' notes and concur. No LMP recorded. (Menstrual status: Other - See Notes). PAST MEDICAL HISTORY  Past Medical History:   Diagnosis Date    Asthma     Bronchitis     Bronchitis        FAMILY HISTORY  History reviewed. No pertinent family history. SOCIAL HISTORY   reports that she has quit smoking. She has never used smokeless tobacco. She reports current alcohol use. She reports current drug use. Drug: Marijuana. SURGICAL HISTORY  History reviewed. No pertinent surgical history.     CURRENT MEDICATIONS      ALLERGIES  No Known Allergies    PHYSICAL EXAM  VITAL SIGNS: /87   Pulse 99   Temp 99.2 °F (37.3 °C) (Oral)   Resp 20   Ht 5' 3\" (1.6 m)   SpO2 98%   BMI 51.55 kg/m²   Constitutional: Well-developed, extremely well-nourished, appears mildly dyspneic, nontoxic, activity: Lying on the cart, no obvious pain, temperature is 104.2. Respiratory rate 24 patient speaking in 7-9 word sentences. HENT: Normocephalic, Atraumatic, Bilateral external ears normal, TM's were normal, Mucus membranes are moist and oropharynx is patent and clear, No pharyngeal erythema, No oral exudates, Nose normal.  Eyes: PERRLA, EOMI, Conjunctiva normal, No discharge. No scleral icterus. Neck: Normal range of motion, mild diffuse paracervical tenderness, Supple. Lymphatic: Mild anterior cervical lymphadenopathy noted. Cardiovascular: Mildly tachycardic heart rate, Normal rhythm, no murmurs, no gallops, no rubs. Thorax & Lungs: Decreased breath sounds, mild respiratory distress, diffuse end expiratory wheezing, no rales, no rhonchi  Abdomen: Soft, Nontender, No hepatosplenomegaly, No masses, No pulsatile masses, No distension, normal bowel sounds  Skin: Warm, Dry, No erythema, No rash. Back: No tenderness, Full range of motion, No scoliosis. Extremities: No edema, No tenderness, No cyanosis, No clubbing. No amputations, capillary refill less than 2 seconds. Musculoskeletal:  No tenderness to palpation, no major deformities noted.   Neurologic: Alert & oriented x 3  Psychiatric: Affect normal, Mood normal.    ?  LABORATORY  Labs Reviewed   CBC WITH AUTO DIFFERENTIAL - Abnormal; Notable for the following components:       Result Value    RBC 5.39 (*)     Lymphocytes Absolute 0.9 (*)     All other components within normal limits    Narrative:     Performed at:  [facility]  [address] [facility] [address]   Phone [phone]   COMPREHENSIVE METABOLIC PANEL - Abnormal; Notable for the following components:    Sodium 134 (*)     Chloride 98 (*)     Glucose 149 (*)     AST 45 (*)     All other components within normal limits    Narrative:     Performed at:  [facility]  [address] [place], [place] [address]   Phone [phone]   D-DIMER, QUANTITATIVE - Abnormal; Notable for the following components:    D-Dimer, Quant 282 (*)     All other components within normal limits    Narrative:     Performed at:  [address] [address]   Phone [phone]   C-REACTIVE PROTEIN - Abnormal; Notable for the following components:    CRP 63.7 (*)     All other components within normal limits    Narrative:     Performed at:  [address] [address]   Phone [phone]   TROPONIN    Narrative:     Performed at:  [facility]  [address] [place] [address]   Phone [phone]   HCG, SERUM, QUALITATIVE    Narrative:     Performed at:  [address] [address]   Phone [phone]   PROCALCITONIN    Narrative:     Performed at:  [facility]  [address] [place] [address]   Phone [phone] EKG  EKG Interpretation    Interpreted by emergency department physician  Time performed: 0231  Time read: 0235    Rhythm: Sinus tachycardia  Ventricular Rate: 124  QRS Axis: 42  Ectopy: None  Conduction: Sinus tachycardia with biatrial abnormalities and LVH by voltage with early repolarization abnormalities  ST Segments: Consistent with early repolarization abnormalities  T Waves: Consistent with early repolarization abnormalities  Q Waves: Lead III only    Other findings: Motion artifact make it difficult to read EKG    Compared to EKG on: None to compare    Clinical Impression: Sinus tachycardia biatrial abnormalities and LVH by voltage with early repolarization abnormalities. There are probable Q waves in lead III only but there is motion artifact make it difficult to read EKG. There is no old EKG to compare.     JUDSON LANGLEY, DO      RADIOLOGY/PROCEDURES  I personally reviewed the images for this case. CT CHEST PULMONARY EMBOLISM W CONTRAST   Final Result   1. Technically limited pulmonary embolism study as detailed above. 2. No large filling defect centrally, and there are no secondary signs of   pulmonary embolism. 3. Diffuse multifocal ground-glass airspace opacities in a pattern that would   favor COVID-19 pneumonia. XR CHEST (2 VW)   Final Result   Asymmetric perihilar opacities on the right may represent vascular congestion   or a developing pattern of pneumonitis. COURSE & MEDICAL DECISION MAKING  Pertinent Labs, EKG, & Imaging studies reviewed.  (See chart for details)    Vitals:    11/01/21 0415 11/01/21 0430 11/01/21 0445 11/01/21 0535   BP: (!) 137/95  125/79 131/87   Pulse: 111 106 105 99   Resp: 15 (!) 32 (!) 33 20   Temp:  100 °F (37.8 °C)  99.2 °F (37.3 °C)   TempSrc:  Oral  Oral   SpO2: 94% 92% 92% 98%   Height:           Medications   0.9 % sodium chloride bolus (1,572 mLs IntraVENous New Bag 11/1/21 0449)   sodium chloride flush 0.9 % injection 5-40 mL (has no administration in time range)   sodium chloride flush 0.9 % injection 5-40 mL (has no administration in time range)   0.9 % sodium chloride infusion (has no administration in time range)   enoxaparin (LOVENOX) injection 40 mg (has no administration in time range)   ondansetron (ZOFRAN-ODT) disintegrating tablet 4 mg (has no administration in time range)     Or   ondansetron (ZOFRAN) injection 4 mg (has no administration in time range)   acetaminophen (TYLENOL) tablet 650 mg (has no administration in time range)     Or   acetaminophen (TYLENOL) suppository 650 mg (has no administration in time range)   dexamethasone (DECADRON) 20 mg in sodium chloride 0.9 % IVPB (has no administration in time range)     Followed by   dexamethasone (DECADRON) injection 10 mg (has no administration in time range)   Vitamin D (CHOLECALCIFEROL) tablet 2,000 Units (has no administration in time range) or medications changed. The patient was instructed to follow up closely with their personal physician to have their blood pressure rechecked. The patient was instructed to take a list of recent blood pressure readings to their next visit with their personal physician. I reviewed old records     (This chart has been completed using [address]. Although attempts have been made to ensure accuracy, words and/or phrases may not be transcribed as intended.)    Patient refused pain medicines at the time of their exam.    IMPRESSION(S):  1. Viral sepsis ([facility] [address]    2. Pneumonia due to COVID-19 virus    3. Dyspnea and respiratory abnormalities        ?   Recheck Times: 0400, 0515  Critical Care Time: 35 minutes not including billable procedures    Diagnostic considerations include but are not limited to:  Acute Coronary Syndrome, Congestive Heart Failure, Myocardial Infarction, Pulmonary Embolus, Pneumonia, Pneumothorax, sepsis, DKA, airway obstruction, bronchitis, burn injury, other         Elisabet Rizo DO  11/01/21 2425

## 2021-11-01 NOTE — PROGRESS NOTES
Per triage note, patient diagnosed with COVID on 10/25. HHN contraindicated at this time. Duoneb HHN cancelled and Albuterol/Ipratropium MDIs ordered in it's place.     Electronically signed by Emy Carmona RCP on 11/1/2021 at 3:37 AM

## 2021-11-01 NOTE — PROGRESS NOTES
Hospitalist Progress Note      PCP: Annette Oh, APRN - CNP    Date of Admission: 11/1/2021    CC shortness of breath, Covid pneumonia  Hospital course  Patient is 72-year-old female with history of obesity was diagnosed with COVID-19 pneumonia on 10/25 came into ER with progressive shortness of breath. She was transiently hypoxic on arrival  Subjective  Patient is sleepy but arousable. She reports that she is feeling a lot better. On room air. Denies any nausea, vomiting, fever, chills. No acute event reported overnight    #COVID-19 pneumonia  CRP 63.7 on room air  Continue trend inflammatory markers  Continue Decadron as patient was hypoxic with exertion  Lovenox 40 twice daily for DVT prophylaxis  Encourage prone position. Activity as tolerated  Check blood sugars with steroids. #Obesity      Medications:  Reviewed    Infusion Medications    sodium chloride       Scheduled Medications    sodium chloride flush  5-40 mL IntraVENous 2 times per day    enoxaparin  40 mg SubCUTAneous BID    dexamethasone  20 mg IntraVENous Q24H    Followed by   Marquita Najjar ON 11/6/2021] dexamethasone  10 mg IntraVENous Q24H    Vitamin D  2,000 Units Oral Daily    influenza virus vaccine  0.5 mL IntraMUSCular Prior to discharge    aspirin  81 mg Oral Daily     PRN Meds: sodium chloride flush, sodium chloride, ondansetron **OR** ondansetron, acetaminophen **OR** acetaminophen      Intake/Output Summary (Last 24 hours) at 11/1/2021 1437  Last data filed at 11/1/2021 1100  Gross per 24 hour   Intake 240 ml   Output --   Net 240 ml       Physical Exam Performed:    /83   Pulse 76   Temp 97.9 °F (36.6 °C) (Oral)   Resp 16   Ht 5' 3\" (1.6 m)   Wt 286 lb 2.5 oz (129.8 kg)   SpO2 96%   BMI 50.69 kg/m²     General appearance: No apparent distress, appears stated age and cooperative. HEENT: Pupils equal, round, and reactive to light. Conjunctivae/corneas clear. Neck: Supple, with full range of motion.  No jugular venous distention. Trachea midline. Respiratory:  Normal respiratory effort. Clear to auscultation, bilaterally without Rales/Wheezes/Rhonchi. Cardiovascular: Regular rate and rhythm with normal S1/S2 without murmurs, rubs or gallops. Abdomen: Soft, non-tender, non-distended with normal bowel sounds. Musculoskeletal: No clubbing, cyanosis or edema bilaterally. Full range of motion without deformity. Skin: Skin color, texture, turgor normal.  No rashes or lesions. Neurologic:  Neurovascularly intact without any focal sensory/motor deficits. Cranial nerves: II-XII intact, grossly non-focal.  Psychiatric: Alert and oriented, thought content appropriate, normal insight  Capillary Refill: Brisk,< 3 seconds   Peripheral Pulses: +2 palpable, equal bilaterally       Labs:   Recent Labs     11/01/21  0305   WBC 6.0   HGB 14.5   HCT 43.8        Recent Labs     11/01/21  0305   *   K 3.7   CL 98*   CO2 21   BUN 7   CREATININE 0.7   CALCIUM 8.6     Recent Labs     11/01/21  0305   AST 45*   ALT 29   BILITOT 0.3   ALKPHOS 43     No results for input(s): INR in the last 72 hours. Recent Labs     11/01/21  0305   TROPONINI <0.01       Urinalysis:      Lab Results   Component Value Date    NITRU Negative 10/01/2021    WBCUA 8 07/30/2020    BACTERIA 4+ 11/16/2015    RBCUA 1 07/30/2020    BLOODU Negative 10/01/2021    SPECGRAV 1.025 10/01/2021    GLUCOSEU Negative 10/01/2021       Radiology:  CT CHEST PULMONARY EMBOLISM W CONTRAST   Final Result   1. Technically limited pulmonary embolism study as detailed above. 2. No large filling defect centrally, and there are no secondary signs of   pulmonary embolism. 3. Diffuse multifocal ground-glass airspace opacities in a pattern that would   favor COVID-19 pneumonia. XR CHEST (2 VW)   Final Result   Asymmetric perihilar opacities on the right may represent vascular congestion   or a developing pattern of pneumonitis. Assessment/Plan:    Active Hospital Problems    Diagnosis Date Noted    Pneumonia due to COVID-19 virus [U07.1, J12.82] 11/01/2021         DVT Prophylaxis: lovenox  Diet: ADULT DIET; Regular  Code Status: Full Code    PT/OT Eval Status: N/A    Dispo - inpatient. Trend inflammatory markers if continued trending down and on room air can be discharged home in 24 to 48 hours.     This chart was likely completed using voice recognition technology and may contain unintended grammatical , phraseology,and/or punctuation errors      Maya Núñez MD

## 2021-11-01 NOTE — H&P
[address]                              HISTORY AND PHYSICAL    PATIENT NAME: Vane López                     :        1995  MED REC NO:   [id number]                          ROOM:       5266  ACCOUNT NO:   [de-identified]                           ADMIT DATE: 2021  PROVIDER:     Alia Lott MD    I examined the patient on 2021. CHIEF COMPLAINT:  Shortness of breath. HISTORY OF PRESENT ILLNESS:  The patient is a 77-year-old morbidly obese  -American female who presented to the hospital with chief  complaints of seven-day history of subacute onset of gradually  progressive flu-like symptoms with cough, fevers, and chills and then  two-day history of subacute onset of gradually progressive, increasing  shortness of breath with even minimal ambulation, without nausea,  vomiting, fevers, or chills. The patient at the time of my exam is  still short of breath when she ambulates but she is feeling a little  better than before. PAST MEDICAL/PAST SURGICAL HISTORY:  Morbid obesity with a BMI of 50.69  kg/m2. PAST SURGICAL HISTORY:  No major surgical procedures. ALLERGIC HISTORY:  No known drug allergies. FAMILY HISTORY:  Reviewed by me and is significant for her fiance with  COVID. SOCIAL HISTORY:  Unvaccinated. Smokes cigarettes and marijuana. MEDICATIONS:  Not on any scheduled medications. REVIEW OF SYSTEMS:  Significant for the shortness of breath and per the  history of present illness. All other systems have been reviewed and  are negative except for the history of present illness. PHYSICAL EXAMINATION:  VITAL SIGNS:  T-max 104.2, respiratory rate is 24, pulse 119, blood  pressure 155/102, saturating 94%. CNS:  The patient is alert, awake and oriented. PSYCH:  The patient is cooperative, answering questions appropriately.   HEENT:  Eyes:  Pupils are reactive to light. ENT:  Extraocular muscle  movements are intact. RESPIRATORY:  No rales, rubs or external auditory rhonchi. The patient  does not have any accessory muscle use. ABDOMEN:  Morbidly obese. MUSCULOSKELETAL:  No acute deformities. SKIN:  Appears without rashes or lesions. Does not appear diaphoretic. DIAGNOSTIC DATA:  CBC showed a white count of 6.0, hemoglobin 14.5,  hematocrit 43.8, platelets of 779. Chest x-ray shows asymmetric perihilar opacities. D-dimer 282. CT chest PE protocol showed diffuse multifocal ground glass airspace  opacity suggestive of moderately severe COVID-19 pneumonia. Procalcitonin 0.08. CRP 63.7. ASSESSMENT:  1. Severe COVID pneumonia with transient hypoxia. 2.  Morbid obesity with a BMI of 50.69 kg/m2. PLAN OF CARE:  The patient is admitted to Internal Medicine service. Currently has been initiated on high dose steroids along with low-dose  aspirin and twice-a-day subcu Lovenox. The patient will also be given a  single dose of IV tocilizumab. CODE STATUS:  Full. Supplemental oxygen will be continued as necessary. EXPECTED LENGTH OF STAY:  More than two midnights based on the plan of  care above. RISK:  Very high due to the risk of progression of the patient's  respiratory failure requiring increasing amount of oxygen. DISPOSITION: Admitted to Internal Medicine Service.         Marv Michelle MD    D: 11/01/2021 6:55:12       T: 11/01/2021 8:34:47     [email or web address]  Job#: [id number]     Doc#: [id number]    CC:

## 2021-11-01 NOTE — ED NOTES
Report called to [facility] on [place], Velrobbin Amen  11/01/21 6872

## 2021-11-01 NOTE — ED NOTES
Oxygen placed at 2 liters per NC for room air saturations of 91%     MARY GRACE JENKINS  11/01/21 0442

## 2021-11-01 NOTE — PROGRESS NOTES
Physician Progress Note      PATIENT:               Amalia Love  CSN #:                  [id number]  :                       1995  ADMIT DATE:       2021 2:35 AM  DISCH DATE:  RESPONDING  PROVIDER #:        Brandt Gonsales MD          QUERY TEXT:    Pt admitted with COVID-19 and noted to have elevated temp, hypoxia, tachypnea,   tachycardia. elevated crp  If possible, please document in progress notes and   discharge summary if you are evaluating and/or treating: The medical record reflects the following:  Risk Factors: + sepsis, pneumonia  Clinical Indicators: On admission temp 104.2, ,  rr 35,  o2 @2l for sat   91%. crp-63.7,  CT Chest-Diffuse multifocal ground-glass airspace opacities   in a pattern that would  favor COVID-19 pneumonia. Treatment: ns bolus, tylenol, decadron, monitor labs, o2    Thank you, Ashlee Mejia RN [email or web address]  Options provided:  -- Sepsis present on admission due to COVID-19 infection  -- Sepsis present on admission due to COVID-19 pneumonia  -- Covid-19 infection without sepsis  -- Covid-19 pneumonia without sepsis  -- Other - I will add my own diagnosis  -- Disagree - Not applicable / Not valid  -- Disagree - Clinically unable to determine / Unknown  -- Refer to Clinical Documentation Reviewer    PROVIDER RESPONSE TEXT:    This patient has sepsis which was present on admission due to COVID-19   infection. Query created by: Adrianne Mejia on 2021 10:14 AM      QUERY TEXT:    Pt admitted with COVID and has respiratory failure documented. If possible,   please document in progress notes and discharge summary further specificity   regarding the type and acuity of respiratory failure: The medical record reflects the following:  Risk Factors: covid, pneumonia, morbid obesity  Clinical Indicators: Documentation per H&P of  Very high due to the risk of   progression of the patient's respiratory failure requiring increasing amount   of oxygen.  On admission  rr 24-35, 91%ra and placed on 2lo2  and sats 93%. Per   ED-, appears mildly dyspneic, Decreased breath sounds, mild respiratory   distress, diffuse end expiratory wheezing, no rales, no rhonchi. Treatment: monitor resp status, monitor o2 sats, [email or web address], wean o2,    Thank you, Efrain Mejia RN CDS CRCR  [email or web address]  Options provided:  -- Acute respiratory failure with hypoxia  -- Acute respiratory failure with hypercapnia  -- Chronic respiratory failure with hypoxia  -- Chronic respiratory failure with hypercapnia  -- Acute on chronic respiratory failure with hypoxia  -- Acute on chronic respiratory failure with hypercapnia  -- Other - I will add my own diagnosis  -- Disagree - Not applicable / Not valid  -- Disagree - Clinically unable to determine / Unknown  -- Refer to Clinical Documentation Reviewer    PROVIDER RESPONSE TEXT:    This patient is in acute respiratory failure with hypoxia. Query created by:  Mayo Mejia on 11/1/2021 10:22 AM      Electronically signed by:  Funmilayo Sher MD 11/1/2021 2:34 PM

## 2021-11-02 ENCOUNTER — APPOINTMENT (OUTPATIENT)
Dept: GENERAL RADIOLOGY | Age: 26
DRG: 871 | End: 2021-11-02

## 2021-11-02 LAB
A/G RATIO: 1.1 (ref 1.1–2.2)
ALBUMIN SERPL-MCNC: 4.1 G/DL (ref 3.4–5)
ALP BLD-CCNC: 38 U/L (ref 40–129)
ALT SERPL-CCNC: 29 U/L (ref 10–40)
ANION GAP SERPL CALCULATED.3IONS-SCNC: 12 MMOL/L (ref 3–16)
AST SERPL-CCNC: 30 U/L (ref 15–37)
BASE EXCESS ARTERIAL: 3.7 MMOL/L (ref -3–3)
BASOPHILS ABSOLUTE: 0 K/UL (ref 0–0.2)
BASOPHILS RELATIVE PERCENT: 0 %
BILIRUB SERPL-MCNC: <0.2 MG/DL (ref 0–1)
BUN BLDV-MCNC: 7 MG/DL (ref 7–20)
C-REACTIVE PROTEIN: 45.3 MG/L (ref 0–5.1)
CALCIUM SERPL-MCNC: 8.9 MG/DL (ref 8.3–10.6)
CARBOXYHEMOGLOBIN ARTERIAL: 0.8 % (ref 0–1.5)
CHLORIDE BLD-SCNC: 102 MMOL/L (ref 99–110)
CO2: 25 MMOL/L (ref 21–32)
CREAT SERPL-MCNC: 0.5 MG/DL (ref 0.6–1.1)
D DIMER: 233 NG/ML DDU (ref 0–229)
EKG ATRIAL RATE: 106 BPM
EKG DIAGNOSIS: NORMAL
EKG P AXIS: 51 DEGREES
EKG P-R INTERVAL: 182 MS
EKG Q-T INTERVAL: 328 MS
EKG QRS DURATION: 88 MS
EKG QTC CALCULATION (BAZETT): 435 MS
EKG R AXIS: 30 DEGREES
EKG T AXIS: 8 DEGREES
EKG VENTRICULAR RATE: 106 BPM
EOSINOPHILS ABSOLUTE: 0 K/UL (ref 0–0.6)
EOSINOPHILS RELATIVE PERCENT: 0 %
ESTIMATED AVERAGE GLUCOSE: 148.5 MG/DL
GFR AFRICAN AMERICAN: >60
GFR NON-AFRICAN AMERICAN: >60
GLUCOSE BLD-MCNC: 100 MG/DL (ref 70–99)
GLUCOSE BLD-MCNC: 113 MG/DL (ref 70–99)
GLUCOSE BLD-MCNC: 142 MG/DL (ref 70–99)
GLUCOSE BLD-MCNC: 197 MG/DL (ref 70–99)
HBA1C MFR BLD: 6.8 %
HCO3 ARTERIAL: 28.2 MMOL/L (ref 21–29)
HCT VFR BLD CALC: 40.4 % (ref 36–48)
HEMOGLOBIN, ART, EXTENDED: 13.8 G/DL (ref 12–16)
HEMOGLOBIN: 13.5 G/DL (ref 12–16)
LYMPHOCYTES ABSOLUTE: 1 K/UL (ref 1–5.1)
LYMPHOCYTES RELATIVE PERCENT: 15.1 %
MCH RBC QN AUTO: 27.1 PG (ref 26–34)
MCHC RBC AUTO-ENTMCNC: 33.5 G/DL (ref 31–36)
MCV RBC AUTO: 80.9 FL (ref 80–100)
METHEMOGLOBIN ARTERIAL: 0.3 %
MONOCYTES ABSOLUTE: 0.3 K/UL (ref 0–1.3)
MONOCYTES RELATIVE PERCENT: 4.4 %
NEUTROPHILS ABSOLUTE: 5.5 K/UL (ref 1.7–7.7)
NEUTROPHILS RELATIVE PERCENT: 80.5 %
O2 SAT, ARTERIAL: 89.4 %
O2 THERAPY: ABNORMAL
PCO2 ARTERIAL: 41.1 MMHG (ref 35–45)
PDW BLD-RTO: 14.5 % (ref 12.4–15.4)
PERFORMED ON: ABNORMAL
PH ARTERIAL: 7.44 (ref 7.35–7.45)
PLATELET # BLD: 220 K/UL (ref 135–450)
PMV BLD AUTO: 8.9 FL (ref 5–10.5)
PO2 ARTERIAL: 53.5 MMHG (ref 75–108)
POTASSIUM REFLEX MAGNESIUM: 3.9 MMOL/L (ref 3.5–5.1)
RBC # BLD: 4.99 M/UL (ref 4–5.2)
SODIUM BLD-SCNC: 139 MMOL/L (ref 136–145)
TCO2 ARTERIAL: 29.5 MMOL/L
TOTAL PROTEIN: 7.7 G/DL (ref 6.4–8.2)
WBC # BLD: 6.8 K/UL (ref 4–11)

## 2021-11-02 PROCEDURE — 93005 ELECTROCARDIOGRAM TRACING: CPT | Performed by: FAMILY MEDICINE

## 2021-11-02 PROCEDURE — 94640 AIRWAY INHALATION TREATMENT: CPT

## 2021-11-02 PROCEDURE — 86140 C-REACTIVE PROTEIN: CPT

## 2021-11-02 PROCEDURE — 71045 X-RAY EXAM CHEST 1 VIEW: CPT

## 2021-11-02 PROCEDURE — 94761 N-INVAS EAR/PLS OXIMETRY MLT: CPT

## 2021-11-02 PROCEDURE — 99255 IP/OBS CONSLTJ NEW/EST HI 80: CPT | Performed by: INTERNAL MEDICINE

## 2021-11-02 PROCEDURE — 2580000003 HC RX 258: Performed by: INTERNAL MEDICINE

## 2021-11-02 PROCEDURE — 6370000000 HC RX 637 (ALT 250 FOR IP): Performed by: INTERNAL MEDICINE

## 2021-11-02 PROCEDURE — 85379 FIBRIN DEGRADATION QUANT: CPT

## 2021-11-02 PROCEDURE — 2700000000 HC OXYGEN THERAPY PER DAY

## 2021-11-02 PROCEDURE — 6370000000 HC RX 637 (ALT 250 FOR IP): Performed by: FAMILY MEDICINE

## 2021-11-02 PROCEDURE — 36600 WITHDRAWAL OF ARTERIAL BLOOD: CPT

## 2021-11-02 PROCEDURE — 36415 COLL VENOUS BLD VENIPUNCTURE: CPT

## 2021-11-02 PROCEDURE — 82803 BLOOD GASES ANY COMBINATION: CPT

## 2021-11-02 PROCEDURE — 80053 COMPREHEN METABOLIC PANEL: CPT

## 2021-11-02 PROCEDURE — 93010 ELECTROCARDIOGRAM REPORT: CPT | Performed by: INTERNAL MEDICINE

## 2021-11-02 PROCEDURE — 1200000000 HC SEMI PRIVATE

## 2021-11-02 PROCEDURE — 85025 COMPLETE CBC W/AUTO DIFF WBC: CPT

## 2021-11-02 PROCEDURE — 6360000002 HC RX W HCPCS: Performed by: INTERNAL MEDICINE

## 2021-11-02 RX ORDER — IPRATROPIUM BROMIDE AND ALBUTEROL SULFATE 2.5; .5 MG/3ML; MG/3ML
1 SOLUTION RESPIRATORY (INHALATION)
Status: DISCONTINUED | OUTPATIENT
Start: 2021-11-02 | End: 2021-11-02

## 2021-11-02 RX ORDER — ALBUTEROL SULFATE 90 UG/1
2 AEROSOL, METERED RESPIRATORY (INHALATION) 4 TIMES DAILY
Status: DISCONTINUED | OUTPATIENT
Start: 2021-11-02 | End: 2021-11-02

## 2021-11-02 RX ORDER — BUDESONIDE AND FORMOTEROL FUMARATE DIHYDRATE 160; 4.5 UG/1; UG/1
2 AEROSOL RESPIRATORY (INHALATION) 2 TIMES DAILY
Status: DISCONTINUED | OUTPATIENT
Start: 2021-11-02 | End: 2021-11-08 | Stop reason: HOSPADM

## 2021-11-02 RX ADMIN — ENOXAPARIN SODIUM 40 MG: 100 INJECTION SUBCUTANEOUS at 08:22

## 2021-11-02 RX ADMIN — ALBUTEROL SULFATE 2 PUFF: 90 AEROSOL, METERED RESPIRATORY (INHALATION) at 11:47

## 2021-11-02 RX ADMIN — SODIUM CHLORIDE, PRESERVATIVE FREE 10 ML: 5 INJECTION INTRAVENOUS at 21:14

## 2021-11-02 RX ADMIN — ASPIRIN 81 MG: 81 TABLET, CHEWABLE ORAL at 08:22

## 2021-11-02 RX ADMIN — Medication 2 PUFF: at 03:51

## 2021-11-02 RX ADMIN — SODIUM CHLORIDE, PRESERVATIVE FREE 10 ML: 5 INJECTION INTRAVENOUS at 08:22

## 2021-11-02 RX ADMIN — ACETAMINOPHEN 650 MG: 325 TABLET ORAL at 16:13

## 2021-11-02 RX ADMIN — ENOXAPARIN SODIUM 40 MG: 100 INJECTION SUBCUTANEOUS at 21:14

## 2021-11-02 RX ADMIN — Medication 2000 UNITS: at 08:22

## 2021-11-02 RX ADMIN — Medication 2 PUFF: at 20:06

## 2021-11-02 RX ADMIN — IPRATROPIUM BROMIDE 2 PUFF: 17 AEROSOL, METERED RESPIRATORY (INHALATION) at 11:47

## 2021-11-02 ASSESSMENT — PAIN SCALES - GENERAL
PAINLEVEL_OUTOF10: 0

## 2021-11-02 NOTE — CONSULTS
PATIENT IS SEEN AT THE REQUEST OF DR. Cleveland Ponce for acute worsening of SOB    CONSULTING PHYSICIAN: Lora    HISTORY OF PRESENT ILLNESS:  This is a 27 y.o. female who presented to the ED on 11/1 with a CC of SOB. Per ED notes she has been sick since the 25th with COVID. Other family members have been sick as well. She said her breathing is getting worse but better than yesterday. Has asthma but smokes cigarettes and marijuana daily. She won't offer much information. Established Pulmonologist:      PAST MEDICAL HISTORY:  Past Medical History:   Diagnosis Date    Asthma     Bronchitis     Bronchitis        PAST SURGICAL HISTORY:  History reviewed. No pertinent surgical history. FAMILY HISTORY:  She would not say    SOCIAL HISTORY:  Smokes tobacco and marijuana on daily basis    Scheduled Meds:   albuterol sulfate HFA  2 puff Inhalation 4x daily    And    ipratropium  2 puff Inhalation 4x daily    sodium chloride flush  5-40 mL IntraVENous 2 times per day    enoxaparin  40 mg SubCUTAneous BID    dexamethasone  20 mg IntraVENous Q24H    Followed by   Linda Mendoza ON 11/6/2021] dexamethasone  10 mg IntraVENous Q24H    Vitamin D  2,000 Units Oral Daily    influenza virus vaccine  0.5 mL IntraMUSCular Prior to discharge    aspirin  81 mg Oral Daily       Continuous Infusions:   sodium chloride 100 mL/hr at 11/01/21 1605       PRN Meds:  sodium chloride flush, sodium chloride, ondansetron **OR** ondansetron, acetaminophen **OR** acetaminophen, benzonatate    ALLERGIES:  Patient has No Known Allergies.     REVIEW OF SYSTEMS:  Constitutional: Felt sick for weeks   HENT: Negative for sore throat  Eyes: Negative for redness   Respiratory: SOB, cough with yellow mucus   Cardiovascular: Negative for chest pain  Gastrointestinal: Negative for vomiting, diarrhea   Genitourinary: Negative for hematuria, negative for dysuria  Musculoskeletal: Negative for arthralgias   Skin: Negative for rash  Neurological: Negative for syncope  Hematological: Negative for adenopathy  Extremities:  Negative for swelling    PHYSICAL EXAM:  Blood pressure 122/85, pulse 100, temperature 99.9 °F (37.7 °C), temperature source Oral, resp. rate 28, height 5' 3\" (1.6 m), weight 284 lb 2.8 oz (128.9 kg), SpO2 95 %.'  Gen: Flat affect, morbidly obese   Eyes: PERRL. No sclera icterus. No conjunctival injection. ENT: No discharge. Pharynx clear. Neck: Trachea midline. No obvious mass. Resp: Work of breathing, forced wheezing, poor air movement   CV: Regular rate. Regular rhythm. No murmur or rub. GI: Non-tender. Non-distended. No hernia. BS present. Skin: Warm and dry. No nodule on exposed extremities. Lymph: No cervical LAD. No supraclavicular LAD. M/S: No cyanosis. No joint deformity. Neuro: Awake. Alert. Moves all four extremities. EXT:   + edema, no clubbing    LABS:  CBC:   Recent Labs     21  0631   WBC 6.0 6.8   HGB 14.5 13.5   HCT 43.8 40.4   MCV 81.3 80.9    220     BMP:   Recent Labs     21  0631   * 139   K 3.7 3.9   CL 98* 102   CO2 21 25   BUN 7 7   CREATININE 0.7 0.5*     LIVER PROFILE:   Recent Labs     21  0631   AST 45* 30   ALT 29 29   BILITOT 0.3 <0.2   ALKPHOS 43 38*     PT/INR: No results for input(s): PROTIME, INR in the last 72 hours. APTT: No results for input(s): APTT in the last 72 hours. UA:No results for input(s): NITRITE, COLORU, PHUR, LABCAST, WBCUA, RBCUA, MUCUS, TRICHOMONAS, YEAST, BACTERIA, CLARITYU, SPECGRAV, LEUKOCYTESUR, UROBILINOGEN, BILIRUBINUR, BLOODU, GLUCOSEU, AMORPHOUS in the last 72 hours.     Invalid input(s): Maurice Castle  Recent Labs     21  1246   PHART 7.444   EOU8NDO 41.1   PO2ART 53.5*       Cultures:  Negative     PFTs:  None      ECHO:  None     AB.    Chest CT:  Chest imaging was reviewed by me and showed bilateral ground glass opacities     I reviewed all the above labs and studies pertaining to this visit. ASSESSMENT/PLAN:  ·  Acute Hypoxic Respiratory Failure with saturations less than 90% on room air due to COVID19 pneumonia  Titrate oxygen for saturations greater than or equal to 88%  · Subjective says is getting worse but is not objectively getting worse. May have anxiety affecting her breathing. · Decadron as is  · Asthma exacerbation   · Add symbicort q 12 hours   · Dc combivent. Needs nebs. I see no reason why they cannot be given. Would do better with nebs due to weight as I doubt she can do a good effort. · BMI 50  · Calorie restriction   · Tobacco and marijuana abuse   · Cessation     DVT prophylaxis  Lovenox bid    May get worse before she gets better.      Remigio Moncada, DO  [facility]

## 2021-11-02 NOTE — PLAN OF CARE
Problem: Airway Clearance - Ineffective  Goal: Achieve or maintain patent airway  11/2/2021 1006 by Shahsank Sanchez RN  Outcome: Ongoing     Problem: Gas Exchange - Impaired  Goal: Absence of hypoxia  11/2/2021 1006 by Shashank Sanchez RN  Outcome: Ongoing     Problem: Gas Exchange - Impaired  Goal: Promote optimal lung function  11/2/2021 1006 by Shashank Sanchez RN  Outcome: Ongoing     Problem: Breathing Pattern - Ineffective  Goal: Ability to achieve and maintain a regular respiratory rate  11/2/2021 1006 by Shashank Sanchez RN  Outcome: Ongoing     Problem: Body Temperature -  Risk of, Imbalanced  Goal: Ability to maintain a body temperature within defined limits  11/2/2021 1006 by Shashank Sanchez RN  Outcome: Ongoing     Problem: Body Temperature -  Risk of, Imbalanced  Goal: Will regain or maintain usual level of consciousness  11/2/2021 1006 by Shashank Sanchez RN  Outcome: Ongoing     Problem:  Body Temperature -  Risk of, Imbalanced  Goal: Complications related to the disease process, condition or treatment will be avoided or minimized  11/2/2021 1006 by Shashank Sanchez RN  Outcome: Ongoing     Problem: Isolation Precautions - Risk of Spread of Infection  Goal: Prevent transmission of infection  11/2/2021 1006 by Shashank Sanchez RN  Outcome: Ongoing     Problem: Nutrition Deficits  Goal: Optimize nutritional status  11/2/2021 1006 by Shashank Sanchez RN  Outcome: Ongoing     Problem: Risk for Fluid Volume Deficit  Goal: Maintain normal heart rhythm  11/2/2021 1006 by Shashank Sanchez RN  Outcome: Ongoing     Problem: Risk for Fluid Volume Deficit  Goal: Maintain absence of muscle cramping  11/2/2021 1006 by Shashank Sanchez RN  Outcome: Ongoing     Problem: Risk for Fluid Volume Deficit  Goal: Maintain normal serum potassium, sodium, calcium, phosphorus, and pH  11/2/2021 1006 by Shashank Sanchez RN  Outcome: Ongoing     Problem: Loneliness or Risk for Loneliness  Goal: Demonstrate positive use of time alone when socialization is not possible  11/2/2021 1006 by Genia Ibrahim RN  Outcome: Ongoing     Problem: Fatigue  Goal: Verbalize increase energy and improved vitality  11/2/2021 1006 by Genia Ibrahim RN  Outcome: Ongoing     Problem: Patient Education: Go to Patient Education Activity  Goal: Patient/Family Education  11/2/2021 1006 by Genia Ibrahim RN  Outcome: Ongoing     Problem: Infection:  Goal: Will remain free from infection  Description: Will remain free from infection  Outcome: Ongoing     Problem: Safety:  Goal: Free from accidental physical injury  Description: Free from accidental physical injury  Outcome: Ongoing     Problem: Safety:  Goal: Free from intentional harm  Description: Free from intentional harm  Outcome: Ongoing     Problem: Daily Care:  Goal: Daily care needs are met  Description: Daily care needs are met  Outcome: Ongoing     Problem: Pain:  Goal: Patient's pain/discomfort is manageable  Description: Patient's pain/discomfort is manageable  Outcome: Ongoing     Problem: Skin Integrity:  Goal: Skin integrity will stabilize  Description: Skin integrity will stabilize  Outcome: Ongoing     Problem: Discharge Planning:  Goal: Patients continuum of care needs are met  Description: Patients continuum of care needs are met  Outcome: Ongoing

## 2021-11-02 NOTE — PROGRESS NOTES
Hospitalist Progress Note      PCP: ASHOK Henry - CNP    Date of Admission: 11/1/2021    Chief Complaint: SOB    Hospital Course:      Subjective: SOB somewhat improved with Combivent       Medications:  Reviewed    Infusion Medications    sodium chloride 100 mL/hr at 11/01/21 1605     Scheduled Medications    budesonide-formoterol  2 puff Inhalation BID    albuterol-ipratropium  1 puff Inhalation Q4H WA    sodium chloride flush  5-40 mL IntraVENous 2 times per day    enoxaparin  40 mg SubCUTAneous BID    dexamethasone  20 mg IntraVENous Q24H    Followed by   Olman Esteban ON 11/6/2021] dexamethasone  10 mg IntraVENous Q24H    Vitamin D  2,000 Units Oral Daily    influenza virus vaccine  0.5 mL IntraMUSCular Prior to discharge    aspirin  81 mg Oral Daily     PRN Meds: sodium chloride flush, sodium chloride, ondansetron **OR** ondansetron, acetaminophen **OR** acetaminophen, benzonatate      Intake/Output Summary (Last 24 hours) at 11/2/2021 1909  Last data filed at 11/2/2021 1608  Gross per 24 hour   Intake 50 ml   Output --   Net 50 ml       Exam:    BP (!) 138/90   Pulse 111   Temp 98.3 °F (36.8 °C) (Oral)   Resp 18   Ht 5' 3\" (1.6 m)   Wt 284 lb 2.8 oz (128.9 kg)   SpO2 94%   BMI 50.34 kg/m²     General appearance: No apparent distress, appears stated age and cooperative. HEENT: Pupils equal, round, and reactive to light. Conjunctivae/corneas clear. Neck: Supple, with full range of motion. No jugular venous distention. Trachea midline. Respiratory:  Normal respiratory effort. Clear to auscultation, bilaterally without Rales/Wheezes/Rhonchi. Cardiovascular: Regular rate and rhythm with normal S1/S2 without murmurs, rubs or gallops. Abdomen: Soft, non-tender, non-distended with normal bowel sounds. Musculoskeletal: No clubbing, cyanosis or edema bilaterally. Full range of motion without deformity. Skin: Skin color, texture, turgor normal.  No rashes or lesions.   Neurologic: Neurovascularly intact without any focal sensory/motor deficits. Cranial nerves: II-XII intact, grossly non-focal.  Psychiatric: Alert and oriented, thought content appropriate, normal insight  Capillary Refill: Brisk,< 3 seconds   Peripheral Pulses: +2 palpable, equal bilaterally       Labs:   Recent Labs     11/01/21 0305 11/02/21  0631   WBC 6.0 6.8   HGB 14.5 13.5   HCT 43.8 40.4    220     Recent Labs     11/01/21 0305 11/02/21  0631   * 139   K 3.7 3.9   CL 98* 102   CO2 21 25   BUN 7 7   CREATININE 0.7 0.5*   CALCIUM 8.6 8.9     Recent Labs     11/01/21 0305 11/02/21  0631   AST 45* 30   ALT 29 29   BILITOT 0.3 <0.2   ALKPHOS 43 38*     No results for input(s): INR in the last 72 hours. Recent Labs     11/01/21 0305   TROPONINI <0.01       Urinalysis:      Lab Results   Component Value Date    NITRU Negative 10/01/2021    WBCUA 8 07/30/2020    BACTERIA 4+ 11/16/2015    RBCUA 1 07/30/2020    BLOODU Negative 10/01/2021    SPECGRAV 1.025 10/01/2021    GLUCOSEU Negative 10/01/2021       Radiology:  XR CHEST PORTABLE   Final Result   Worsening COVID-19 pneumonia. CT CHEST PULMONARY EMBOLISM W CONTRAST   Final Result   1. Technically limited pulmonary embolism study as detailed above. 2. No large filling defect centrally, and there are no secondary signs of   pulmonary embolism. 3. Diffuse multifocal ground-glass airspace opacities in a pattern that would   favor COVID-19 pneumonia. XR CHEST (2 VW)   Final Result   Asymmetric perihilar opacities on the right may represent vascular congestion   or a developing pattern of pneumonitis.                  Assessment/Plan:    Active Hospital Problems    Diagnosis Date Noted    Acute respiratory failure with hypoxia (HCC) [J96.01]     Mild intermittent asthma with exacerbation [J45.21]     BMI 50.0-59.9, adult ([facility] [address] [Z68.43]     Pneumonia due to COVID-19 virus [U07.1, J12.82] 11/01/2021     Acute respiratory failure due to Covid, asthma exacerbation:  - steroids. Baricitinib, remdesivir per pharmacy  - combivent vs duonebs per pulmonary  - symbicort started  - wean to RA as tolerated    Morbid obesity:  - increases index of severity    Tocacco and MJ abuse:  - cessation counseling done    DVT Prophylaxis: lovenox  Diet: ADULT DIET;  Regular  Code Status: Full Code    PT/OT Eval Status: n/a    Dispo - PCU    Maddy Gaston MD

## 2021-11-02 NOTE — PLAN OF CARE
Problem: Airway Clearance - Ineffective  Goal: Achieve or maintain patent airway  11/2/2021 0055 by Ulises Sapp RN  Outcome: Ongoing     Problem: Gas Exchange - Impaired  Goal: Absence of hypoxia  11/2/2021 0055 by Ulises Sapp RN  Outcome: Ongoing     Problem: Gas Exchange - Impaired  Goal: Promote optimal lung function  11/2/2021 0055 by Ulises Sapp RN  Outcome: Ongoing     Problem: Breathing Pattern - Ineffective  Goal: Ability to achieve and maintain a regular respiratory rate  11/2/2021 0055 by Ulises Sapp RN  Outcome: Ongoing     Problem: Body Temperature -  Risk of, Imbalanced  Goal: Ability to maintain a body temperature within defined limits  11/2/2021 0055 by Ulises Sapp RN  Outcome: Ongoing     Problem: Body Temperature -  Risk of, Imbalanced  Goal: Will regain or maintain usual level of consciousness  11/2/2021 0055 by Ulises Sapp RN  Outcome: Ongoing     Problem:  Body Temperature -  Risk of, Imbalanced  Goal: Complications related to the disease process, condition or treatment will be avoided or minimized  11/2/2021 0055 by Ulises Sapp RN  Outcome: Ongoing     Problem: Isolation Precautions - Risk of Spread of Infection  Goal: Prevent transmission of infection  11/2/2021 0055 by Ulises Sapp RN  Outcome: Ongoing     Problem: Nutrition Deficits  Goal: Optimize nutritional status  11/2/2021 0055 by Ulises Sapp RN  Outcome: Ongoing     Problem: Risk for Fluid Volume Deficit  Goal: Maintain normal heart rhythm  11/2/2021 0055 by Ulises Sapp RN  Outcome: Ongoing     Problem: Risk for Fluid Volume Deficit  Goal: Maintain absence of muscle cramping  11/2/2021 0055 by Ulises Sapp RN  Outcome: Ongoing     Problem: Risk for Fluid Volume Deficit  Goal: Maintain normal serum potassium, sodium, calcium, phosphorus, and pH  11/2/2021 0055 by Ulises Sapp RN  Outcome: Ongoing     Problem: Loneliness or Risk for Loneliness  Goal: Demonstrate positive use of time alone when socialization is not possible  11/2/2021 0055 by Andrey De Jesus RN  Outcome: Ongoing     Problem: Fatigue  Goal: Verbalize increase energy and improved vitality  11/2/2021 0055 by Andrey De Jesus RN  Outcome: Ongoing     Problem: Patient Education: Go to Patient Education Activity  Goal: Patient/Family Education  11/2/2021 0055 by Andrey De Jesus RN  Outcome: Ongoing

## 2021-11-03 LAB
GLUCOSE BLD-MCNC: 118 MG/DL (ref 70–99)
GLUCOSE BLD-MCNC: 138 MG/DL (ref 70–99)
GLUCOSE BLD-MCNC: 193 MG/DL (ref 70–99)
GLUCOSE BLD-MCNC: 92 MG/DL (ref 70–99)
PERFORMED ON: ABNORMAL
PERFORMED ON: NORMAL

## 2021-11-03 PROCEDURE — 2700000000 HC OXYGEN THERAPY PER DAY

## 2021-11-03 PROCEDURE — 2580000003 HC RX 258: Performed by: INTERNAL MEDICINE

## 2021-11-03 PROCEDURE — 1200000000 HC SEMI PRIVATE

## 2021-11-03 PROCEDURE — 94640 AIRWAY INHALATION TREATMENT: CPT

## 2021-11-03 PROCEDURE — 94761 N-INVAS EAR/PLS OXIMETRY MLT: CPT

## 2021-11-03 PROCEDURE — 6370000000 HC RX 637 (ALT 250 FOR IP): Performed by: INTERNAL MEDICINE

## 2021-11-03 PROCEDURE — 99233 SBSQ HOSP IP/OBS HIGH 50: CPT | Performed by: INTERNAL MEDICINE

## 2021-11-03 PROCEDURE — 6360000002 HC RX W HCPCS: Performed by: INTERNAL MEDICINE

## 2021-11-03 RX ADMIN — Medication 2000 UNITS: at 07:19

## 2021-11-03 RX ADMIN — SODIUM CHLORIDE, PRESERVATIVE FREE 10 ML: 5 INJECTION INTRAVENOUS at 07:20

## 2021-11-03 RX ADMIN — ENOXAPARIN SODIUM 40 MG: 100 INJECTION SUBCUTANEOUS at 20:03

## 2021-11-03 RX ADMIN — Medication 2 PUFF: at 20:12

## 2021-11-03 RX ADMIN — ENOXAPARIN SODIUM 40 MG: 100 INJECTION SUBCUTANEOUS at 08:27

## 2021-11-03 RX ADMIN — SODIUM CHLORIDE, PRESERVATIVE FREE 10 ML: 5 INJECTION INTRAVENOUS at 20:03

## 2021-11-03 RX ADMIN — ASPIRIN 81 MG: 81 TABLET, CHEWABLE ORAL at 07:19

## 2021-11-03 RX ADMIN — Medication 2 PUFF: at 08:19

## 2021-11-03 ASSESSMENT — PAIN SCALES - GENERAL
PAINLEVEL_OUTOF10: 0

## 2021-11-03 NOTE — PROGRESS NOTES
RN asked to meet patient's mom and another visitor outside of the room. RN went to speak with mom and mom immediately starting accusing staff of neglecting the patient. Stating that staff was incapable of taking care of patients, began cussing and raising voice. RN attempting to explain that the staff was unaware of situation and that it was never brought to dayshift staff attention. Patient's mom did not allow RN to speak or even give an explanation. Patient had been provided with bath wipes and incontinence care earlier in the shift and a breakfast tray was also not delivered to patients room this morning. This was not brought to staff attention until lunch time when a lunch tray was already ordered. Dietary had stated that they had called the room numerous times but there was no answer. Patient educated on calling dietary if needing to place an order and the importance of answering the phone to get an order placed. RN placed lunch order for patient. Patient's mom getting unreasonable with staff, mom began cussing and raising her voice. Mom demanding to speak with night shift staff and RN brought mom to speak with charge nurse to hopefully straighten issues out. Mom then got unreasonable with charge nurse, began cussing and raising voice. Code violet called. RN and RN Supervisor went into patient room to address needs and explain that it was not intentional.     RN and PCA walked patient to bathroom to use the bathroom and patient is in the shower.

## 2021-11-03 NOTE — PLAN OF CARE
Problem: Airway Clearance - Ineffective  Goal: Achieve or maintain patent airway  11/3/2021 0922 by Lilibeth Cole RN  Outcome: Ongoing     Problem: Gas Exchange - Impaired  Goal: Absence of hypoxia  11/3/2021 0922 by Lilibeth Cole RN  Outcome: Ongoing     Problem: Gas Exchange - Impaired  Goal: Promote optimal lung function  11/3/2021 0922 by Lilibeth Cole RN  Outcome: Ongoing     Problem: Breathing Pattern - Ineffective  Goal: Ability to achieve and maintain a regular respiratory rate  11/3/2021 0922 by Lilibeth Cole RN  Outcome: Ongoing     Problem: Body Temperature -  Risk of, Imbalanced  Goal: Ability to maintain a body temperature within defined limits  11/3/2021 0922 by Lilibeth Cole RN  Outcome: Ongoing     Problem: Body Temperature -  Risk of, Imbalanced  Goal: Will regain or maintain usual level of consciousness  11/3/2021 0922 by Lilibeth Cole RN  Outcome: Ongoing     Problem:  Body Temperature -  Risk of, Imbalanced  Goal: Complications related to the disease process, condition or treatment will be avoided or minimized  11/3/2021 0922 by Lilibeth Cole RN  Outcome: Ongoing     Problem: Isolation Precautions - Risk of Spread of Infection  Goal: Prevent transmission of infection  11/3/2021 0922 by Lilibeth Cole RN  Outcome: Ongoing     Problem: Nutrition Deficits  Goal: Optimize nutritional status  11/3/2021 0922 by Lilibeth Cole RN  Outcome: Ongoing     Problem: Risk for Fluid Volume Deficit  Goal: Maintain normal heart rhythm  11/3/2021 0922 by Lilibeth Cole RN  Outcome: Ongoing     Problem: Risk for Fluid Volume Deficit  Goal: Maintain absence of muscle cramping  11/3/2021 0922 by Lilibeth Cole RN  Outcome: Ongoing     Problem: Risk for Fluid Volume Deficit  Goal: Maintain normal serum potassium, sodium, calcium, phosphorus, and pH  11/3/2021 0922 by Lilibeth Cole RN  Outcome: Ongoing     Problem: Fatigue  Goal: Verbalize increase energy and improved vitality  11/3/2021 0922 by Lilibeth Cole RN  Outcome: Ongoing Problem: Patient Education: Go to Patient Education Activity  Goal: Patient/Family Education  11/3/2021 0922 by Genia Ibrahim RN  Outcome: Ongoing     Problem: Infection:  Goal: Will remain free from infection  Description: Will remain free from infection  11/3/2021 0922 by Genia Ibrahim RN  Outcome: Ongoing     Problem: Safety:  Goal: Free from accidental physical injury  Description: Free from accidental physical injury  11/3/2021 0922 by Genia Ibrahim RN  Outcome: Ongoing     Problem: Safety:  Goal: Free from intentional harm  Description: Free from intentional harm  11/3/2021 0922 by Genia Ibrahim RN  Outcome: Ongoing     Problem: Daily Care:  Goal: Daily care needs are met  Description: Daily care needs are met  11/3/2021 5969 by Genia Ibrahim RN  Outcome: Ongoing     Problem: Pain:  Goal: Patient's pain/discomfort is manageable  Description: Patient's pain/discomfort is manageable  11/3/2021 2733 by Genia Ibrahim RN  Outcome: Ongoing     Problem: Skin Integrity:  Goal: Skin integrity will stabilize  Description: Skin integrity will stabilize  11/3/2021 0922 by Genia Ibrahim RN  Outcome: Ongoing     Problem: Discharge Planning:  Goal: Patients continuum of care needs are met  Description: Patients continuum of care needs are met  11/3/2021 4198 by Genia Ibrahim RN  Outcome: Ongoing

## 2021-11-03 NOTE — PROGRESS NOTES
Hospitalist Progress Note      PCP: ASHOK Blake CNP    Date of Admission: 11/1/2021    Chief Complaint: SOB    Hospital Course:      Subjective: out of bed to chair today      Medications:  Reviewed    Infusion Medications    sodium chloride 100 mL/hr at 11/01/21 1605     Scheduled Medications    budesonide-formoterol  2 puff Inhalation BID    albuterol-ipratropium  1 puff Inhalation Q4H WA    sodium chloride flush  5-40 mL IntraVENous 2 times per day    enoxaparin  40 mg SubCUTAneous BID    dexamethasone  20 mg IntraVENous Q24H    Followed by   Lorne Guzmán ON 11/6/2021] dexamethasone  10 mg IntraVENous Q24H    Vitamin D  2,000 Units Oral Daily    influenza virus vaccine  0.5 mL IntraMUSCular Prior to discharge    aspirin  81 mg Oral Daily     PRN Meds: sodium chloride flush, sodium chloride, ondansetron **OR** ondansetron, acetaminophen **OR** acetaminophen, benzonatate      Intake/Output Summary (Last 24 hours) at 11/3/2021 1040  Last data filed at 11/2/2021 1608  Gross per 24 hour   Intake 50 ml   Output --   Net 50 ml       Exam:    /87   Pulse 81   Temp 98.4 °F (36.9 °C) (Oral)   Resp 24   Ht 5' 3\" (1.6 m)   Wt 280 lb (127 kg)   SpO2 99%   BMI 49.60 kg/m²     General appearance: No apparent distress, appears stated age and cooperative. HEENT: Pupils equal, round, and reactive to light. Conjunctivae/corneas clear. Neck: Supple, with full range of motion. No jugular venous distention. Trachea midline. Respiratory:  Normal respiratory effort. Clear to auscultation, bilaterally without Rales/Wheezes/Rhonchi. Cardiovascular: Regular rate and rhythm with normal S1/S2 without murmurs, rubs or gallops. Abdomen: Soft, non-tender, non-distended with normal bowel sounds. Musculoskeletal: No clubbing, cyanosis or edema bilaterally. Full range of motion without deformity. Skin: Skin color, texture, turgor normal.  No rashes or lesions.   Neurologic:  Neurovascularly intact without any focal sensory/motor deficits. Cranial nerves: II-XII intact, grossly non-focal.  Psychiatric: Alert and oriented, thought content appropriate, normal insight  Capillary Refill: Brisk,< 3 seconds   Peripheral Pulses: +2 palpable, equal bilaterally       Labs:   Recent Labs     11/01/21 0305 11/02/21  0631   WBC 6.0 6.8   HGB 14.5 13.5   HCT 43.8 40.4    220     Recent Labs     11/01/21 0305 11/02/21  0631   * 139   K 3.7 3.9   CL 98* 102   CO2 21 25   BUN 7 7   CREATININE 0.7 0.5*   CALCIUM 8.6 8.9     Recent Labs     11/01/21 0305 11/02/21  0631   AST 45* 30   ALT 29 29   BILITOT 0.3 <0.2   ALKPHOS 43 38*     No results for input(s): INR in the last 72 hours. Recent Labs     11/01/21 0305   TROPONINI <0.01       Urinalysis:      Lab Results   Component Value Date    NITRU Negative 10/01/2021    WBCUA 8 07/30/2020    BACTERIA 4+ 11/16/2015    RBCUA 1 07/30/2020    BLOODU Negative 10/01/2021    SPECGRAV 1.025 10/01/2021    GLUCOSEU Negative 10/01/2021       Radiology:  XR CHEST PORTABLE   Final Result   Worsening COVID-19 pneumonia. CT CHEST PULMONARY EMBOLISM W CONTRAST   Final Result   1. Technically limited pulmonary embolism study as detailed above. 2. No large filling defect centrally, and there are no secondary signs of   pulmonary embolism. 3. Diffuse multifocal ground-glass airspace opacities in a pattern that would   favor COVID-19 pneumonia. XR CHEST (2 VW)   Final Result   Asymmetric perihilar opacities on the right may represent vascular congestion   or a developing pattern of pneumonitis.                  Assessment/Plan:    Active Hospital Problems    Diagnosis Date Noted    Acute respiratory failure with hypoxia (HCC) [J96.01]     Mild intermittent asthma with exacerbation [J45.21]     BMI 50.0-59.9, adult ([facility] [address] [Z68.43]     Pneumonia due to COVID-19 virus [U07.1, J12.82] 11/01/2021     Acute respiratory failure due to Covid, asthma exacerbation:  - steroids. Baricitinib, remdesivir per pharmacy  - combivent, not able to do duonebs on Covid patients per pharmacy  - symbicort started  - wean to RA as tolerated    Morbid obesity:  - increases index of severity    Tocacco and MJ abuse:  - cessation counseling done    DVT Prophylaxis: lovenox  Diet: ADULT DIET;  Regular  Code Status: Full Code    PT/OT Eval Status: n/a    Dispo - PCU    Jakob Read MD

## 2021-11-03 NOTE — PLAN OF CARE
Problem: Airway Clearance - Ineffective  Goal: Achieve or maintain patent airway  Outcome: Ongoing     Problem: Gas Exchange - Impaired  Goal: Absence of hypoxia  Outcome: Ongoing  Goal: Promote optimal lung function  Outcome: Ongoing     Problem: Breathing Pattern - Ineffective  Goal: Ability to achieve and maintain a regular respiratory rate  Outcome: Ongoing     Problem:  Body Temperature -  Risk of, Imbalanced  Goal: Ability to maintain a body temperature within defined limits  Outcome: Ongoing  Goal: Will regain or maintain usual level of consciousness  Outcome: Ongoing  Goal: Complications related to the disease process, condition or treatment will be avoided or minimized  Outcome: Ongoing     Problem: Isolation Precautions - Risk of Spread of Infection  Goal: Prevent transmission of infection  Outcome: Ongoing     Problem: Nutrition Deficits  Goal: Optimize nutritional status  Outcome: Ongoing     Problem: Risk for Fluid Volume Deficit  Goal: Maintain normal heart rhythm  Outcome: Ongoing  Goal: Maintain absence of muscle cramping  Outcome: Ongoing  Goal: Maintain normal serum potassium, sodium, calcium, phosphorus, and pH  Outcome: Ongoing     Problem: Loneliness or Risk for Loneliness  Goal: Demonstrate positive use of time alone when socialization is not possible  Outcome: Ongoing     Problem: Fatigue  Goal: Verbalize increase energy and improved vitality  Outcome: Ongoing     Problem: Patient Education: Go to Patient Education Activity  Goal: Patient/Family Education  Outcome: Ongoing     Problem: Infection:  Goal: Will remain free from infection  Description: Will remain free from infection  Outcome: Ongoing     Problem: Safety:  Goal: Free from accidental physical injury  Description: Free from accidental physical injury  Outcome: Ongoing  Goal: Free from intentional harm  Description: Free from intentional harm  Outcome: Ongoing     Problem: Daily Care:  Goal: Daily care needs are met  Description: Daily care needs are met  Outcome: Ongoing     Problem: Pain:  Goal: Patient's pain/discomfort is manageable  Description: Patient's pain/discomfort is manageable  Outcome: Ongoing     Problem: Skin Integrity:  Goal: Skin integrity will stabilize  Description: Skin integrity will stabilize  Outcome: Ongoing     Problem: Discharge Planning:  Goal: Patients continuum of care needs are met  Description: Patients continuum of care needs are met  Outcome: Ongoing

## 2021-11-03 NOTE — PROGRESS NOTES
Pulmonary Progress Note    CC:  Follow up respiratory failure due to COVID    Subjective:  1-2 liters of oxygen   Won't say much  Says her chest hurts  Less SOB    ROS  Less SOB  Chest hurts       Intake/Output Summary (Last 24 hours) at 11/3/2021 0736  Last data filed at 11/2/2021 1608  Gross per 24 hour   Intake 50 ml   Output --   Net 50 ml         PHYSICAL EXAM:  Blood pressure 134/87, pulse 81, temperature 98.4 °F (36.9 °C), temperature source Oral, resp. rate 24, height 5' 3\" (1.6 m), weight 280 lb (127 kg), SpO2 93 %.'  Gen: Very flat affect, appears she does not want to be bothered. Obese   Eyes: PERRL. No sclera icterus. No conjunctival injection. ENT: No discharge. Pharynx clear. External appearance of ears and nose normal.  Neck: Trachea midline. No obvious mass. Resp: Diminished, very poor effort   CV: Regular rate. Regular rhythm. No murmur or rub. GI: Non-tender. Non-distended. No hernia. Skin: Warm, dry, normal texture and turgor. No nodule on exposed extremities. Lymph: No cervical LAD. No supraclavicular LAD. M/S: No cyanosis. No clubbing. No joint deformity. Neuro: Moves all four extremities. CN 2-12 tested, no defect noted.   Ext:   + edema    Medications:    Scheduled Meds:   budesonide-formoterol  2 puff Inhalation BID    albuterol-ipratropium  1 puff Inhalation Q4H WA    sodium chloride flush  5-40 mL IntraVENous 2 times per day    enoxaparin  40 mg SubCUTAneous BID    dexamethasone  20 mg IntraVENous Q24H    Followed by   Robles Hall ON 11/6/2021] dexamethasone  10 mg IntraVENous Q24H    Vitamin D  2,000 Units Oral Daily    influenza virus vaccine  0.5 mL IntraMUSCular Prior to discharge    aspirin  81 mg Oral Daily       Continuous Infusions:   sodium chloride 100 mL/hr at 11/01/21 1605       PRN Meds:  sodium chloride flush, sodium chloride, ondansetron **OR** ondansetron, acetaminophen **OR** acetaminophen, benzonatate    Labs:  CBC:   Recent Labs     11/01/21  0305 11/02/21  0631   WBC 6.0 6.8   HGB 14.5 13.5   HCT 43.8 40.4   MCV 81.3 80.9    220     BMP:   Recent Labs     11/01/21 0305 11/02/21 0631   * 139   K 3.7 3.9   CL 98* 102   CO2 21 25   BUN 7 7   CREATININE 0.7 0.5*     LIVER PROFILE:   Recent Labs     11/01/21 0305 11/02/21 0631   AST 45* 30   ALT 29 29   BILITOT 0.3 <0.2   ALKPHOS 43 38*     PT/INR: No results for input(s): PROTIME, INR in the last 72 hours. APTT: No results for input(s): APTT in the last 72 hours. UA:No results for input(s): NITRITE, COLORU, PHUR, LABCAST, WBCUA, RBCUA, MUCUS, TRICHOMONAS, YEAST, BACTERIA, CLARITYU, SPECGRAV, LEUKOCYTESUR, UROBILINOGEN, BILIRUBINUR, BLOODU, GLUCOSEU, AMORPHOUS in the last 72 hours. Invalid input(s): Ryland Monteiro  No results for input(s): PH, PCO2, PO2 in the last 72 hours. Films:  Chest imaging reports were reviewed and imaging was reviewed by me and showed no new films today     ABG:  None    Cultures:  None    I reviewed the labs and images listed above    ASSESSMENT/PLAN:  ·  Acute Hypoxic Respiratory Failure with saturations less than 90% on room air due to COVID19 pneumonia  · Titrate oxygen for saturations greater than or equal to 88%  ? Decadron (technically would not need this high of dose due to oxygen requirements but likely will benefit due to asthma)  · Asthma exacerbation   ? Symbicort q 12 hours   ? Duonebs. I see no reason why we cannot do nebs with COVID, plus due to her weight she would probably do better with nebs rather than MDIs   · BMI 50  ? Calorie restriction   · Tobacco and marijuana abuse   ?  Cessation      DVT prophylaxis  Lovenox bid      William Kellogg, DO [facility]

## 2021-11-04 LAB
ANION GAP SERPL CALCULATED.3IONS-SCNC: 11 MMOL/L (ref 3–16)
BUN BLDV-MCNC: 11 MG/DL (ref 7–20)
C-REACTIVE PROTEIN: 30.7 MG/L (ref 0–5.1)
CALCIUM SERPL-MCNC: 8.7 MG/DL (ref 8.3–10.6)
CHLORIDE BLD-SCNC: 104 MMOL/L (ref 99–110)
CO2: 29 MMOL/L (ref 21–32)
CREAT SERPL-MCNC: 0.6 MG/DL (ref 0.6–1.1)
GFR AFRICAN AMERICAN: >60
GFR NON-AFRICAN AMERICAN: >60
GLUCOSE BLD-MCNC: 106 MG/DL (ref 70–99)
GLUCOSE BLD-MCNC: 122 MG/DL (ref 70–99)
GLUCOSE BLD-MCNC: 146 MG/DL (ref 70–99)
GLUCOSE BLD-MCNC: 263 MG/DL (ref 70–99)
GLUCOSE BLD-MCNC: 99 MG/DL (ref 70–99)
HCT VFR BLD CALC: 39.5 % (ref 36–48)
HEMOGLOBIN: 13.2 G/DL (ref 12–16)
MCH RBC QN AUTO: 27.3 PG (ref 26–34)
MCHC RBC AUTO-ENTMCNC: 33.5 G/DL (ref 31–36)
MCV RBC AUTO: 81.6 FL (ref 80–100)
PDW BLD-RTO: 14.6 % (ref 12.4–15.4)
PERFORMED ON: ABNORMAL
PLATELET # BLD: 272 K/UL (ref 135–450)
PMV BLD AUTO: 8.8 FL (ref 5–10.5)
POTASSIUM SERPL-SCNC: 4.7 MMOL/L (ref 3.5–5.1)
RBC # BLD: 4.84 M/UL (ref 4–5.2)
SODIUM BLD-SCNC: 144 MMOL/L (ref 136–145)
WBC # BLD: 8.4 K/UL (ref 4–11)

## 2021-11-04 PROCEDURE — 6360000002 HC RX W HCPCS: Performed by: INTERNAL MEDICINE

## 2021-11-04 PROCEDURE — 2580000003 HC RX 258: Performed by: INTERNAL MEDICINE

## 2021-11-04 PROCEDURE — 2700000000 HC OXYGEN THERAPY PER DAY

## 2021-11-04 PROCEDURE — 86140 C-REACTIVE PROTEIN: CPT

## 2021-11-04 PROCEDURE — 94761 N-INVAS EAR/PLS OXIMETRY MLT: CPT

## 2021-11-04 PROCEDURE — 6370000000 HC RX 637 (ALT 250 FOR IP): Performed by: FAMILY MEDICINE

## 2021-11-04 PROCEDURE — 94640 AIRWAY INHALATION TREATMENT: CPT

## 2021-11-04 PROCEDURE — 1200000000 HC SEMI PRIVATE

## 2021-11-04 PROCEDURE — 80048 BASIC METABOLIC PNL TOTAL CA: CPT

## 2021-11-04 PROCEDURE — 36415 COLL VENOUS BLD VENIPUNCTURE: CPT

## 2021-11-04 PROCEDURE — 6370000000 HC RX 637 (ALT 250 FOR IP): Performed by: INTERNAL MEDICINE

## 2021-11-04 PROCEDURE — 85027 COMPLETE CBC AUTOMATED: CPT

## 2021-11-04 RX ADMIN — Medication 2 PUFF: at 07:50

## 2021-11-04 RX ADMIN — SODIUM CHLORIDE, PRESERVATIVE FREE 10 ML: 5 INJECTION INTRAVENOUS at 20:50

## 2021-11-04 RX ADMIN — Medication 2000 UNITS: at 08:58

## 2021-11-04 RX ADMIN — ENOXAPARIN SODIUM 40 MG: 100 INJECTION SUBCUTANEOUS at 20:49

## 2021-11-04 RX ADMIN — BARICITINIB 4 MG: 2 TABLET, FILM COATED ORAL at 11:18

## 2021-11-04 RX ADMIN — ASPIRIN 81 MG: 81 TABLET, CHEWABLE ORAL at 08:42

## 2021-11-04 RX ADMIN — Medication 2 PUFF: at 20:14

## 2021-11-04 RX ADMIN — SODIUM CHLORIDE, PRESERVATIVE FREE 10 ML: 5 INJECTION INTRAVENOUS at 08:37

## 2021-11-04 RX ADMIN — ENOXAPARIN SODIUM 40 MG: 100 INJECTION SUBCUTANEOUS at 08:42

## 2021-11-04 ASSESSMENT — PAIN SCALES - GENERAL
PAINLEVEL_OUTOF10: 0

## 2021-11-04 NOTE — PROGRESS NOTES
While in the pt room, pt refused to get off the phone with her father so that I could perform my assessment. I asked the patient to practice deep breathing exercises and she failed to understand or follow my instructions regarding deep breathing or using the IS. Will report my findings in rounds and to provider and will attempt to assess the patient alone when I go back in her room.

## 2021-11-04 NOTE — PROGRESS NOTES
intact without any focal sensory/motor deficits. Cranial nerves: II-XII intact, grossly non-focal.  Psychiatric: Alert and oriented, thought content appropriate, normal insight  Capillary Refill: Brisk,< 3 seconds   Peripheral Pulses: +2 palpable, equal bilaterally       Labs:   Recent Labs     11/02/21  0631   WBC 6.8   HGB 13.5   HCT 40.4        Recent Labs     11/02/21  0631      K 3.9      CO2 25   BUN 7   CREATININE 0.5*   CALCIUM 8.9     Recent Labs     11/02/21  0631   AST 30   ALT 29   BILITOT <0.2   ALKPHOS 38*     No results for input(s): INR in the last 72 hours. No results for input(s): Barbara Horn in the last 72 hours. Urinalysis:      Lab Results   Component Value Date    NITRU Negative 10/01/2021    WBCUA 8 07/30/2020    BACTERIA 4+ 11/16/2015    RBCUA 1 07/30/2020    BLOODU Negative 10/01/2021    SPECGRAV 1.025 10/01/2021    GLUCOSEU Negative 10/01/2021       Radiology:  XR CHEST PORTABLE   Final Result   Worsening COVID-19 pneumonia. CT CHEST PULMONARY EMBOLISM W CONTRAST   Final Result   1. Technically limited pulmonary embolism study as detailed above. 2. No large filling defect centrally, and there are no secondary signs of   pulmonary embolism. 3. Diffuse multifocal ground-glass airspace opacities in a pattern that would   favor COVID-19 pneumonia. XR CHEST (2 VW)   Final Result   Asymmetric perihilar opacities on the right may represent vascular congestion   or a developing pattern of pneumonitis. Assessment/Plan:    Active Hospital Problems    Diagnosis Date Noted    Acute respiratory failure with hypoxia ([facility]) [J96.01]     Mild intermittent asthma with exacerbation [J45.21]     BMI 50.0-59.9, adult ([facility] [Z68.43]     Pneumonia due to COVID-19 virus [U07.1, J12.82] 11/01/2021     Acute respiratory failure due to Covid, asthma exacerbation:  - steroids.   Baricitinib, remdesivir per pharmacy  - combivent, not able to do luc on Covid patients per pharmacy  - symbicort started  - wean to RA as tolerated    Morbid obesity:  - increases index of severity    Tocacco and MJ abuse:  - cessation counseling done    DVT Prophylaxis: lovenox  Diet: ADULT DIET;  Regular  Code Status: Full Code    PT/OT Eval Status: n/a    Dispo - PCU    Sundeep Flores MD

## 2021-11-05 LAB
A/G RATIO: 1.4 (ref 1.1–2.2)
ALBUMIN SERPL-MCNC: 3.8 G/DL (ref 3.4–5)
ALP BLD-CCNC: 38 U/L (ref 40–129)
ALT SERPL-CCNC: 27 U/L (ref 10–40)
ANION GAP SERPL CALCULATED.3IONS-SCNC: 8 MMOL/L (ref 3–16)
AST SERPL-CCNC: 17 U/L (ref 15–37)
BASOPHILS ABSOLUTE: 0 K/UL (ref 0–0.2)
BASOPHILS RELATIVE PERCENT: 0 %
BILIRUB SERPL-MCNC: <0.2 MG/DL (ref 0–1)
BUN BLDV-MCNC: 10 MG/DL (ref 7–20)
CALCIUM SERPL-MCNC: 8.9 MG/DL (ref 8.3–10.6)
CHLORIDE BLD-SCNC: 100 MMOL/L (ref 99–110)
CO2: 31 MMOL/L (ref 21–32)
CREAT SERPL-MCNC: 0.6 MG/DL (ref 0.6–1.1)
EOSINOPHILS ABSOLUTE: 0 K/UL (ref 0–0.6)
EOSINOPHILS RELATIVE PERCENT: 0 %
GFR AFRICAN AMERICAN: >60
GFR NON-AFRICAN AMERICAN: >60
GLUCOSE BLD-MCNC: 106 MG/DL (ref 70–99)
GLUCOSE BLD-MCNC: 107 MG/DL (ref 70–99)
GLUCOSE BLD-MCNC: 119 MG/DL (ref 70–99)
GLUCOSE BLD-MCNC: 155 MG/DL (ref 70–99)
GLUCOSE BLD-MCNC: 238 MG/DL (ref 70–99)
HCT VFR BLD CALC: 38.2 % (ref 36–48)
HEMOGLOBIN: 12.7 G/DL (ref 12–16)
LYMPHOCYTES ABSOLUTE: 3.3 K/UL (ref 1–5.1)
LYMPHOCYTES RELATIVE PERCENT: 37 %
MCH RBC QN AUTO: 27 PG (ref 26–34)
MCHC RBC AUTO-ENTMCNC: 33.3 G/DL (ref 31–36)
MCV RBC AUTO: 81.2 FL (ref 80–100)
METAMYELOCYTES RELATIVE PERCENT: 4 %
MONOCYTES ABSOLUTE: 0.8 K/UL (ref 0–1.3)
MONOCYTES RELATIVE PERCENT: 9 %
NEUTROPHILS ABSOLUTE: 4.8 K/UL (ref 1.7–7.7)
NEUTROPHILS RELATIVE PERCENT: 50 %
PDW BLD-RTO: 14.3 % (ref 12.4–15.4)
PERFORMED ON: ABNORMAL
PLATELET # BLD: 278 K/UL (ref 135–450)
PLATELET SLIDE REVIEW: ADEQUATE
PMV BLD AUTO: 9.2 FL (ref 5–10.5)
POTASSIUM SERPL-SCNC: 3.7 MMOL/L (ref 3.5–5.1)
RBC # BLD: 4.7 M/UL (ref 4–5.2)
SLIDE REVIEW: ABNORMAL
SODIUM BLD-SCNC: 139 MMOL/L (ref 136–145)
TOTAL PROTEIN: 6.6 G/DL (ref 6.4–8.2)
WBC # BLD: 8.9 K/UL (ref 4–11)

## 2021-11-05 PROCEDURE — 6370000000 HC RX 637 (ALT 250 FOR IP): Performed by: INTERNAL MEDICINE

## 2021-11-05 PROCEDURE — 2700000000 HC OXYGEN THERAPY PER DAY

## 2021-11-05 PROCEDURE — 94640 AIRWAY INHALATION TREATMENT: CPT

## 2021-11-05 PROCEDURE — 1200000000 HC SEMI PRIVATE

## 2021-11-05 PROCEDURE — 36415 COLL VENOUS BLD VENIPUNCTURE: CPT

## 2021-11-05 PROCEDURE — 2580000003 HC RX 258: Performed by: INTERNAL MEDICINE

## 2021-11-05 PROCEDURE — 85025 COMPLETE CBC W/AUTO DIFF WBC: CPT

## 2021-11-05 PROCEDURE — 80053 COMPREHEN METABOLIC PANEL: CPT

## 2021-11-05 PROCEDURE — 6360000002 HC RX W HCPCS: Performed by: INTERNAL MEDICINE

## 2021-11-05 PROCEDURE — 6370000000 HC RX 637 (ALT 250 FOR IP): Performed by: FAMILY MEDICINE

## 2021-11-05 PROCEDURE — 94761 N-INVAS EAR/PLS OXIMETRY MLT: CPT

## 2021-11-05 RX ADMIN — ENOXAPARIN SODIUM 40 MG: 100 INJECTION SUBCUTANEOUS at 09:38

## 2021-11-05 RX ADMIN — SODIUM CHLORIDE, PRESERVATIVE FREE 10 ML: 5 INJECTION INTRAVENOUS at 09:38

## 2021-11-05 RX ADMIN — Medication 2 PUFF: at 20:14

## 2021-11-05 RX ADMIN — ASPIRIN 81 MG: 81 TABLET, CHEWABLE ORAL at 09:38

## 2021-11-05 RX ADMIN — SODIUM CHLORIDE, PRESERVATIVE FREE 10 ML: 5 INJECTION INTRAVENOUS at 20:12

## 2021-11-05 RX ADMIN — Medication 2000 UNITS: at 09:38

## 2021-11-05 RX ADMIN — BARICITINIB 4 MG: 2 TABLET, FILM COATED ORAL at 09:38

## 2021-11-05 RX ADMIN — Medication 2 PUFF: at 08:33

## 2021-11-05 ASSESSMENT — PAIN SCALES - GENERAL
PAINLEVEL_OUTOF10: 0

## 2021-11-05 NOTE — CARE COORDINATION
Discharge Planning:   Patient remains on 1LO2. Spoke with patient regarding discharge planning. Patient confirmed they spoke to MD today, and discussed PT/OT. Patient confirmed they do not have active health insurance. Patient reports they work full time. Recommended PT/OT order from MD.   Jeronimo Shows to financial counselor. Left voicemail notifying of patient not having insurance. Financial Assistance ([phone]) telephone number provided to patient.    CAMI Shen, TALAT, Social Work/Case Management   [phone]  Electronically signed by CAMI Shen LSW on 11/5/2021 at 3:46 PM

## 2021-11-05 NOTE — PROGRESS NOTES
Hospitalist Progress Note      PCP: ASHOK Soni CNP    Date of Admission: 11/1/2021    Chief Complaint: SOB    Hospital Course:      Subjective: very tired but SOB improving      Medications:  Reviewed    Infusion Medications    sodium chloride Stopped (11/01/21 1759)     Scheduled Medications    baricitinib  4 mg Oral Daily    budesonide-formoterol  2 puff Inhalation BID    albuterol-ipratropium  1 puff Inhalation Q4H WA    sodium chloride flush  5-40 mL IntraVENous 2 times per day    enoxaparin  40 mg SubCUTAneous BID    dexamethasone  20 mg IntraVENous Q24H    Followed by   Nina Donovan ON 11/6/2021] dexamethasone  10 mg IntraVENous Q24H    Vitamin D  2,000 Units Oral Daily    influenza virus vaccine  0.5 mL IntraMUSCular Prior to discharge    aspirin  81 mg Oral Daily     PRN Meds: sodium chloride flush, sodium chloride, ondansetron **OR** ondansetron, acetaminophen **OR** acetaminophen, benzonatate    No intake or output data in the 24 hours ending 11/05/21 0913    Exam:    BP (!) 153/80   Pulse 50   Temp 98.3 °F (36.8 °C) (Oral)   Resp 14   Ht 5' 3\" (1.6 m)   Wt 289 lb 7.4 oz (131.3 kg)   SpO2 98%   BMI 51.28 kg/m²     General appearance: No apparent distress, appears stated age and cooperative. HEENT: Pupils equal, round, and reactive to light. Conjunctivae/corneas clear. Neck: Supple, with full range of motion. No jugular venous distention. Trachea midline. Respiratory:  Normal respiratory effort. Clear to auscultation, bilaterally without Rales/Wheezes/Rhonchi. Cardiovascular: Regular rate and rhythm with normal S1/S2 without murmurs, rubs or gallops. Abdomen: Soft, non-tender, non-distended with normal bowel sounds. Musculoskeletal: No clubbing, cyanosis or edema bilaterally. Full range of motion without deformity. Skin: Skin color, texture, turgor normal.  No rashes or lesions. Neurologic:  Neurovascularly intact without any focal sensory/motor deficits.  Cranial nerves: II-XII intact, grossly non-focal.  Psychiatric: Alert and oriented, thought content appropriate, normal insight  Capillary Refill: Brisk,< 3 seconds   Peripheral Pulses: +2 palpable, equal bilaterally       Labs:   Recent Labs     11/04/21  1119   WBC 8.4   HGB 13.2   HCT 39.5        Recent Labs     11/04/21  1119      K 4.7      CO2 29   BUN 11   CREATININE 0.6   CALCIUM 8.7     No results for input(s): AST, ALT, BILIDIR, BILITOT, ALKPHOS in the last 72 hours. No results for input(s): INR in the last 72 hours. No results for input(s): Niecy Ill in the last 72 hours. Urinalysis:      Lab Results   Component Value Date    NITRU Negative 10/01/2021    WBCUA 8 07/30/2020    BACTERIA 4+ 11/16/2015    RBCUA 1 07/30/2020    BLOODU Negative 10/01/2021    SPECGRAV 1.025 10/01/2021    GLUCOSEU Negative 10/01/2021       Radiology:  XR CHEST PORTABLE   Final Result   Worsening COVID-19 pneumonia. CT CHEST PULMONARY EMBOLISM W CONTRAST   Final Result   1. Technically limited pulmonary embolism study as detailed above. 2. No large filling defect centrally, and there are no secondary signs of   pulmonary embolism. 3. Diffuse multifocal ground-glass airspace opacities in a pattern that would   favor COVID-19 pneumonia. XR CHEST (2 VW)   Final Result   Asymmetric perihilar opacities on the right may represent vascular congestion   or a developing pattern of pneumonitis. Assessment/Plan:    Active Hospital Problems    Diagnosis Date Noted    Acute respiratory failure with hypoxia ([facility]) [J96.01]     Mild intermittent asthma with exacerbation [J45.21]     [facility] 50.0-59.9, [facility] [Z68.43]     Pneumonia due to COVID-19 virus [U07.1, J12.82] 11/01/2021     Acute respiratory failure due to Covid, asthma exacerbation:  - steroids.   Baricitinib, remdesivir per pharmacy  - combivent, not able to do duonebs on Covid patients per pharmacy  - symbicort started  - wean to RA as tolerated  - encourage ambulation to help hypoxia    Morbid obesity:  - increases index of severity    Tocacco and MJ abuse:  - cessation counseling done    DVT Prophylaxis: lovenox  Diet: ADULT DIET;  Regular  Code Status: Full Code    PT/OT Eval Status: ordered    Layla Marie MD

## 2021-11-05 NOTE — PROGRESS NOTES
Attempted to wean pt to room air. Pt's O2 would dip down to mid 80s, 1 L of O2 applied back onto pt.      Electronically signed by Anam Canales RN on 11/5/2021 at 1:16 PM

## 2021-11-05 NOTE — PLAN OF CARE
Problem: Airway Clearance - Ineffective  Goal: Achieve or maintain patent airway  Outcome: Ongoing     Problem: Gas Exchange - Impaired  Goal: Absence of hypoxia  Outcome: Ongoing  Goal: Promote optimal lung function  Outcome: Ongoing     Problem: Breathing Pattern - Ineffective  Goal: Ability to achieve and maintain a regular respiratory rate  Outcome: Ongoing     Problem:  Body Temperature -  Risk of, Imbalanced  Goal: Ability to maintain a body temperature within defined limits  Outcome: Ongoing  Goal: Will regain or maintain usual level of consciousness  Outcome: Ongoing  Goal: Complications related to the disease process, condition or treatment will be avoided or minimized  Outcome: Ongoing     Problem: Isolation Precautions - Risk of Spread of Infection  Goal: Prevent transmission of infection  Outcome: Ongoing     Problem: Nutrition Deficits  Goal: Optimize nutritional status  Outcome: Ongoing     Problem: Risk for Fluid Volume Deficit  Goal: Maintain normal heart rhythm  Outcome: Ongoing  Goal: Maintain absence of muscle cramping  Outcome: Ongoing  Goal: Maintain normal serum potassium, sodium, calcium, phosphorus, and pH  Outcome: Ongoing     Problem: Loneliness or Risk for Loneliness  Goal: Demonstrate positive use of time alone when socialization is not possible  Outcome: Ongoing     Problem: Fatigue  Goal: Verbalize increase energy and improved vitality  Outcome: Ongoing     Problem: Patient Education: Go to Patient Education Activity  Goal: Patient/Family Education  Outcome: Ongoing     Problem: Infection:  Goal: Will remain free from infection  Description: Will remain free from infection  Outcome: Ongoing     Problem: Safety:  Goal: Free from accidental physical injury  Description: Free from accidental physical injury  Outcome: Ongoing  Goal: Free from intentional harm  Description: Free from intentional harm  Outcome: Ongoing     Problem: Daily Care:  Goal: Daily care needs are met  Description: Daily care needs are met  Outcome: Ongoing     Problem: Pain:  Goal: Patient's pain/discomfort is manageable  Description: Patient's pain/discomfort is manageable  Outcome: Ongoing     Problem: Skin Integrity:  Goal: Skin integrity will stabilize  Description: Skin integrity will stabilize  Outcome: Ongoing     Problem: Discharge Planning:  Goal: Patients continuum of care needs are met  Description: Patients continuum of care needs are met  Outcome: Ongoing     Problem: COMMUNICATION IMPAIRMENT  Goal: Ability to express needs and understand communication  Outcome: Ongoing

## 2021-11-06 LAB
A/G RATIO: 1.1 (ref 1.1–2.2)
ALBUMIN SERPL-MCNC: 3.7 G/DL (ref 3.4–5)
ALP BLD-CCNC: 36 U/L (ref 40–129)
ALT SERPL-CCNC: 30 U/L (ref 10–40)
ANION GAP SERPL CALCULATED.3IONS-SCNC: 13 MMOL/L (ref 3–16)
AST SERPL-CCNC: 25 U/L (ref 15–37)
BASOPHILS ABSOLUTE: 0 K/UL (ref 0–0.2)
BASOPHILS RELATIVE PERCENT: 0 %
BILIRUB SERPL-MCNC: 0.3 MG/DL (ref 0–1)
BUN BLDV-MCNC: 9 MG/DL (ref 7–20)
CALCIUM SERPL-MCNC: 9.1 MG/DL (ref 8.3–10.6)
CHLORIDE BLD-SCNC: 99 MMOL/L (ref 99–110)
CO2: 25 MMOL/L (ref 21–32)
CREAT SERPL-MCNC: 0.6 MG/DL (ref 0.6–1.1)
EOSINOPHILS ABSOLUTE: 0 K/UL (ref 0–0.6)
EOSINOPHILS RELATIVE PERCENT: 0 %
GFR AFRICAN AMERICAN: >60
GFR NON-AFRICAN AMERICAN: >60
GLUCOSE BLD-MCNC: 115 MG/DL (ref 70–99)
GLUCOSE BLD-MCNC: 137 MG/DL (ref 70–99)
GLUCOSE BLD-MCNC: 150 MG/DL (ref 70–99)
GLUCOSE BLD-MCNC: 154 MG/DL (ref 70–99)
GLUCOSE BLD-MCNC: 210 MG/DL (ref 70–99)
HCT VFR BLD CALC: 41.2 % (ref 36–48)
HEMOGLOBIN: 13.2 G/DL (ref 12–16)
LYMPHOCYTES ABSOLUTE: 2.5 K/UL (ref 1–5.1)
LYMPHOCYTES RELATIVE PERCENT: 24 %
MCH RBC QN AUTO: 26.1 PG (ref 26–34)
MCHC RBC AUTO-ENTMCNC: 31.9 G/DL (ref 31–36)
MCV RBC AUTO: 81.7 FL (ref 80–100)
METAMYELOCYTES RELATIVE PERCENT: 1 %
MONOCYTES ABSOLUTE: 0.3 K/UL (ref 0–1.3)
MONOCYTES RELATIVE PERCENT: 3 %
MYELOCYTE PERCENT: 3 %
NEUTROPHILS ABSOLUTE: 7.7 K/UL (ref 1.7–7.7)
NEUTROPHILS RELATIVE PERCENT: 69 %
PDW BLD-RTO: 14.2 % (ref 12.4–15.4)
PERFORMED ON: ABNORMAL
PLATELET # BLD: 306 K/UL (ref 135–450)
PMV BLD AUTO: 8.6 FL (ref 5–10.5)
POTASSIUM SERPL-SCNC: 4.4 MMOL/L (ref 3.5–5.1)
RBC # BLD: 5.04 M/UL (ref 4–5.2)
RBC # BLD: NORMAL 10*6/UL
SODIUM BLD-SCNC: 137 MMOL/L (ref 136–145)
TOTAL PROTEIN: 7.2 G/DL (ref 6.4–8.2)
WBC # BLD: 10.6 K/UL (ref 4–11)

## 2021-11-06 PROCEDURE — 93325 DOPPLER ECHO COLOR FLOW MAPG: CPT

## 2021-11-06 PROCEDURE — 80053 COMPREHEN METABOLIC PANEL: CPT

## 2021-11-06 PROCEDURE — 93321 DOPPLER ECHO F-UP/LMTD STD: CPT

## 2021-11-06 PROCEDURE — 93308 TTE F-UP OR LMTD: CPT

## 2021-11-06 PROCEDURE — 6370000000 HC RX 637 (ALT 250 FOR IP): Performed by: INTERNAL MEDICINE

## 2021-11-06 PROCEDURE — 6370000000 HC RX 637 (ALT 250 FOR IP): Performed by: FAMILY MEDICINE

## 2021-11-06 PROCEDURE — 2580000003 HC RX 258: Performed by: INTERNAL MEDICINE

## 2021-11-06 PROCEDURE — 6360000002 HC RX W HCPCS: Performed by: INTERNAL MEDICINE

## 2021-11-06 PROCEDURE — 94761 N-INVAS EAR/PLS OXIMETRY MLT: CPT

## 2021-11-06 PROCEDURE — 85025 COMPLETE CBC W/AUTO DIFF WBC: CPT

## 2021-11-06 PROCEDURE — 94640 AIRWAY INHALATION TREATMENT: CPT

## 2021-11-06 PROCEDURE — 2700000000 HC OXYGEN THERAPY PER DAY

## 2021-11-06 PROCEDURE — 1200000000 HC SEMI PRIVATE

## 2021-11-06 PROCEDURE — 36415 COLL VENOUS BLD VENIPUNCTURE: CPT

## 2021-11-06 RX ADMIN — SODIUM CHLORIDE, PRESERVATIVE FREE 10 ML: 5 INJECTION INTRAVENOUS at 20:46

## 2021-11-06 RX ADMIN — DEXAMETHASONE SODIUM PHOSPHATE 10 MG: 10 INJECTION INTRAMUSCULAR; INTRAVENOUS at 13:48

## 2021-11-06 RX ADMIN — ENOXAPARIN SODIUM 40 MG: 100 INJECTION SUBCUTANEOUS at 09:13

## 2021-11-06 RX ADMIN — Medication 1 PUFF: at 17:17

## 2021-11-06 RX ADMIN — Medication 2000 UNITS: at 09:12

## 2021-11-06 RX ADMIN — ENOXAPARIN SODIUM 40 MG: 100 INJECTION SUBCUTANEOUS at 20:46

## 2021-11-06 RX ADMIN — BARICITINIB 4 MG: 2 TABLET, FILM COATED ORAL at 09:12

## 2021-11-06 RX ADMIN — Medication 2 PUFF: at 20:15

## 2021-11-06 RX ADMIN — Medication 2 PUFF: at 09:20

## 2021-11-06 RX ADMIN — ASPIRIN 81 MG: 81 TABLET, CHEWABLE ORAL at 09:12

## 2021-11-06 RX ADMIN — SODIUM CHLORIDE, PRESERVATIVE FREE 10 ML: 5 INJECTION INTRAVENOUS at 09:13

## 2021-11-06 ASSESSMENT — PAIN SCALES - GENERAL
PAINLEVEL_OUTOF10: 0

## 2021-11-06 NOTE — RT PROTOCOL NOTE
RT Inhaler-Nebulizer Bronchodilator Protocol Note    There is a bronchodilator order in the chart from a provider indicating to follow the RT Bronchodilator Protocol and there is an Initiate RT Inhaler-Nebulizer Bronchodilator Protocol order as well (see protocol at bottom of note). CXR Findings:  No results found. The findings from the last RT Protocol Assessment were as follows:   History Pulmonary Disease: None or smoker <15 pack years  Respiratory Pattern: Regular pattern and RR 12-20 bpm  Breath Sounds: Slightly diminished and/or crackles  Cough: Strong, productive  Indication for Bronchodilator Therapy: None  Bronchodilator Assessment Score: 3    Aerosolized bronchodilator medication orders have been revised according to the RT Inhaler-Nebulizer Bronchodilator Protocol below. Respiratory Therapist to perform RT Therapy Protocol Assessment initially then follow the protocol. Repeat RT Therapy Protocol Assessment PRN for score 0-3 or on second treatment, BID, and PRN for scores above 3. No Indications - adjust the frequency to every 6 hours PRN wheezing or bronchospasm, if no treatments needed after 48 hours then discontinue using Per Protocol order mode. If indication present, adjust the RT bronchodilator orders based on the Bronchodilator Assessment Score as indicated below. Use Inhaler orders unless patient has one or more of the following: on home nebulizer, not able to hold breath for 10 seconds, is not alert and oriented, cannot activate and use MDI correctly, or respiratory rate 25 breaths per minute or more, then use the equivalent nebulizer order(s) with same Frequency and PRN reasons based on the score. If a patient is on this medication at home then do not decrease Frequency below that used at home.     0-3 - enter or revise RT bronchodilator order(s) to equivalent RT Bronchodilator order with Frequency of every 4 hours PRN for wheezing or increased work of breathing using Per Protocol order mode. 4-6 - enter or revise RT Bronchodilator order(s) to two equivalent RT bronchodilator orders with one order with BID Frequency and one order with Frequency of every 4 hours PRN wheezing or increased work of breathing using Per Protocol order mode. 7-10 - enter or revise RT Bronchodilator order(s) to two equivalent RT bronchodilator orders with one order with TID Frequency and one order with Frequency of every 4 hours PRN wheezing or increased work of breathing using Per Protocol order mode. 11-13 - enter or revise RT Bronchodilator order(s) to one equivalent RT bronchodilator order with QID Frequency and an Albuterol order with Frequency of every 4 hours PRN wheezing or increased work of breathing using Per Protocol order mode. Greater than 13 - enter or revise RT Bronchodilator order(s) to one equivalent RT bronchodilator order with every 4 hours Frequency and an Albuterol order with Frequency of every 2 hours PRN wheezing or increased work of breathing using Per Protocol order mode. RT to enter RT Home Evaluation for COPD & MDI Assessment order using Per Protocol order mode.     Electronically signed by Jeffery Macedo RCP on 11/6/2021 at 12:13 PM

## 2021-11-06 NOTE — RT PROTOCOL NOTE
RT Inhaler-Nebulizer Bronchodilator Protocol Note    There is a bronchodilator order in the chart from a provider indicating to follow the RT Bronchodilator Protocol and there is an Initiate RT Inhaler-Nebulizer Bronchodilator Protocol order as well (see protocol at bottom of note). CXR Findings:  No results found. The findings from the last RT Protocol Assessment were as follows:   History Pulmonary Disease: Chronic pulmonary disease  Respiratory Pattern: Dyspnea on exertion or RR 21-25 bpm  Breath Sounds: Intermittent or unilateral wheezes  Cough: Strong, productive  Indication for Bronchodilator Therapy: On home bronchodilators  Bronchodilator Assessment Score: 9    Aerosolized bronchodilator medication orders have been revised according to the RT Inhaler-Nebulizer Bronchodilator Protocol below. Respiratory Therapist to perform RT Therapy Protocol Assessment initially then follow the protocol. Repeat RT Therapy Protocol Assessment PRN for score 0-3 or on second treatment, BID, and PRN for scores above 3. No Indications - adjust the frequency to every 6 hours PRN wheezing or bronchospasm, if no treatments needed after 48 hours then discontinue using Per Protocol order mode. If indication present, adjust the RT bronchodilator orders based on the Bronchodilator Assessment Score as indicated below. Use Inhaler orders unless patient has one or more of the following: on home nebulizer, not able to hold breath for 10 seconds, is not alert and oriented, cannot activate and use MDI correctly, or respiratory rate 25 breaths per minute or more, then use the equivalent nebulizer order(s) with same Frequency and PRN reasons based on the score. If a patient is on this medication at home then do not decrease Frequency below that used at home.     0-3 - enter or revise RT bronchodilator order(s) to equivalent RT Bronchodilator order with Frequency of every 4 hours PRN for wheezing or increased work of breathing using Per Protocol order mode. 4-6 - enter or revise RT Bronchodilator order(s) to two equivalent RT bronchodilator orders with one order with BID Frequency and one order with Frequency of every 4 hours PRN wheezing or increased work of breathing using Per Protocol order mode. 7-10 - enter or revise RT Bronchodilator order(s) to two equivalent RT bronchodilator orders with one order with TID Frequency and one order with Frequency of every 4 hours PRN wheezing or increased work of breathing using Per Protocol order mode. 11-13 - enter or revise RT Bronchodilator order(s) to one equivalent RT bronchodilator order with QID Frequency and an Albuterol order with Frequency of every 4 hours PRN wheezing or increased work of breathing using Per Protocol order mode. Greater than 13 - enter or revise RT Bronchodilator order(s) to one equivalent RT bronchodilator order with every 4 hours Frequency and an Albuterol order with Frequency of every 2 hours PRN wheezing or increased work of breathing using Per Protocol order mode. RT to enter RT Home Evaluation for COPD & MDI Assessment order using Per Protocol order mode.     Electronically signed by Jazzy Villalobos RCP on 11/6/2021 at 5:20 PM

## 2021-11-06 NOTE — PROGRESS NOTES
Hospitalist Progress Note      PCP: ASHOK Henry - CNP    Date of Admission: 11/1/2021    Chief Complaint: SOB    Hospital Course:      Subjective: mostly lying around in bed      Medications:  Reviewed    Infusion Medications    sodium chloride Stopped (11/01/21 1759)     Scheduled Medications    baricitinib  4 mg Oral Daily    budesonide-formoterol  2 puff Inhalation BID    sodium chloride flush  5-40 mL IntraVENous 2 times per day    enoxaparin  40 mg SubCUTAneous BID    dexamethasone  10 mg IntraVENous Q24H    Vitamin D  2,000 Units Oral Daily    influenza virus vaccine  0.5 mL IntraMUSCular Prior to discharge    aspirin  81 mg Oral Daily     PRN Meds: albuterol-ipratropium, sodium chloride flush, sodium chloride, ondansetron **OR** ondansetron, acetaminophen **OR** acetaminophen, benzonatate      Intake/Output Summary (Last 24 hours) at 11/6/2021 1224  Last data filed at 11/6/2021 0921  Gross per 24 hour   Intake 600 ml   Output --   Net 600 ml       Exam:    /72   Pulse 57   Temp 98 °F (36.7 °C) (Oral)   Resp 18   Ht 5' 3\" (1.6 m)   Wt 290 lb 2 oz (131.6 kg)   SpO2 97%   BMI 51.39 kg/m²     General appearance: No apparent distress, appears stated age and cooperative. HEENT: Pupils equal, round, and reactive to light. Conjunctivae/corneas clear. Neck: Supple, with full range of motion. No jugular venous distention. Trachea midline. Respiratory:  Normal respiratory effort. Clear to auscultation, bilaterally without Rales/Wheezes/Rhonchi. Cardiovascular: Regular rate and rhythm with normal S1/S2 without murmurs, rubs or gallops. Abdomen: Soft, non-tender, non-distended with normal bowel sounds. Musculoskeletal: No clubbing, cyanosis or edema bilaterally. Full range of motion without deformity. Skin: Skin color, texture, turgor normal.  No rashes or lesions. Neurologic:  Neurovascularly intact without any focal sensory/motor deficits.  Cranial nerves: II-XII intact, grossly non-focal.  Psychiatric: Alert and oriented, thought content appropriate, normal insight  Capillary Refill: Brisk,< 3 seconds   Peripheral Pulses: +2 palpable, equal bilaterally       Labs:   Recent Labs     11/04/21  1119 11/05/21  1530   WBC 8.4 8.9   HGB 13.2 12.7   HCT 39.5 38.2    278     Recent Labs     11/04/21  1119 11/05/21  1530    139   K 4.7 3.7    100   CO2 29 31   BUN 11 10   CREATININE 0.6 0.6   CALCIUM 8.7 8.9     Recent Labs     11/05/21  1530   AST 17   ALT 27   BILITOT <0.2   ALKPHOS 38*     No results for input(s): INR in the last 72 hours. No results for input(s): [place] in the last 72 hours. Urinalysis:      Lab Results   Component Value Date    NITRU Negative 10/01/2021    WBCUA 8 07/30/2020    BACTERIA 4+ 11/16/2015    RBCUA 1 07/30/2020    BLOODU Negative 10/01/2021    SPECGRAV 1.025 10/01/2021    GLUCOSEU Negative 10/01/2021       Radiology:  XR CHEST PORTABLE   Final Result   Worsening COVID-19 pneumonia. CT CHEST PULMONARY EMBOLISM W CONTRAST   Final Result   1. Technically limited pulmonary embolism study as detailed above. 2. No large filling defect centrally, and there are no secondary signs of   pulmonary embolism. 3. Diffuse multifocal ground-glass airspace opacities in a pattern that would   favor COVID-19 pneumonia. XR CHEST (2 VW)   Final Result   Asymmetric perihilar opacities on the right may represent vascular congestion   or a developing pattern of pneumonitis. Assessment/Plan:    Active Hospital Problems    Diagnosis Date Noted    Acute respiratory failure with hypoxia (HCC) [J96.01]     Mild intermittent asthma with exacerbation [J45.21]     BMI 50.0-59.9, adult ([facility] [address] [Z68.43]     Pneumonia due to COVID-19 virus [U07.1, J12.82] 11/01/2021     Acute respiratory failure due to Covid, asthma exacerbation:  - steroids.   Baricitinib, remdesivir per pharmacy  - combivent, not able to do duonebs on Covid patients per pharmacy  - symbicort started  - wean to RA as tolerated  - encourage ambulation to help hypoxia  - given decreased mobility and high BMI, will send low dose Eliquis to pharmacy for two week course after discharge    Morbid obesity:  - increases index of severity    Tocacco and MJ abuse:  - cessation counseling done    DVT Prophylaxis: lovenox  Diet: ADULT DIET; Regular  Code Status: Full Code    PT/OT Eval Status: ordered    Dispo - PCU, patient encouagement to ambulate and be proactive about inhaler usage.   Can D/C when patient more proactive about improvement    Katy Pineda MD

## 2021-11-07 LAB
A/G RATIO: 1.3 (ref 1.1–2.2)
ALBUMIN SERPL-MCNC: 3.8 G/DL (ref 3.4–5)
ALP BLD-CCNC: 48 U/L (ref 40–129)
ALT SERPL-CCNC: 29 U/L (ref 10–40)
ANION GAP SERPL CALCULATED.3IONS-SCNC: 14 MMOL/L (ref 3–16)
AST SERPL-CCNC: 14 U/L (ref 15–37)
ATYPICAL LYMPHOCYTE RELATIVE PERCENT: 1 % (ref 0–6)
BANDED NEUTROPHILS RELATIVE PERCENT: 1 % (ref 0–7)
BASOPHILS ABSOLUTE: 0 K/UL (ref 0–0.2)
BASOPHILS RELATIVE PERCENT: 0 %
BILIRUB SERPL-MCNC: <0.2 MG/DL (ref 0–1)
BUN BLDV-MCNC: 11 MG/DL (ref 7–20)
CALCIUM SERPL-MCNC: 8.8 MG/DL (ref 8.3–10.6)
CHLORIDE BLD-SCNC: 100 MMOL/L (ref 99–110)
CO2: 25 MMOL/L (ref 21–32)
CREAT SERPL-MCNC: 0.5 MG/DL (ref 0.6–1.1)
EOSINOPHILS ABSOLUTE: 0 K/UL (ref 0–0.6)
EOSINOPHILS RELATIVE PERCENT: 0 %
GFR AFRICAN AMERICAN: >60
GFR NON-AFRICAN AMERICAN: >60
GLUCOSE BLD-MCNC: 131 MG/DL (ref 70–99)
GLUCOSE BLD-MCNC: 160 MG/DL (ref 70–99)
GLUCOSE BLD-MCNC: 170 MG/DL (ref 70–99)
GLUCOSE BLD-MCNC: 250 MG/DL (ref 70–99)
GLUCOSE BLD-MCNC: 344 MG/DL (ref 70–99)
HCT VFR BLD CALC: 41.5 % (ref 36–48)
HEMOGLOBIN: 13.4 G/DL (ref 12–16)
LYMPHOCYTES ABSOLUTE: 1.8 K/UL (ref 1–5.1)
LYMPHOCYTES RELATIVE PERCENT: 14 %
MCH RBC QN AUTO: 26.3 PG (ref 26–34)
MCHC RBC AUTO-ENTMCNC: 32.2 G/DL (ref 31–36)
MCV RBC AUTO: 81.8 FL (ref 80–100)
MONOCYTES ABSOLUTE: 0.7 K/UL (ref 0–1.3)
MONOCYTES RELATIVE PERCENT: 6 %
NEUTROPHILS ABSOLUTE: 9.7 K/UL (ref 1.7–7.7)
NEUTROPHILS RELATIVE PERCENT: 78 %
PDW BLD-RTO: 13.8 % (ref 12.4–15.4)
PERFORMED ON: ABNORMAL
PLATELET # BLD: 300 K/UL (ref 135–450)
PLATELET SLIDE REVIEW: ADEQUATE
PMV BLD AUTO: 8.8 FL (ref 5–10.5)
POTASSIUM SERPL-SCNC: 4 MMOL/L (ref 3.5–5.1)
RBC # BLD: 5.08 M/UL (ref 4–5.2)
RBC # BLD: NORMAL 10*6/UL
SLIDE REVIEW: ABNORMAL
SODIUM BLD-SCNC: 139 MMOL/L (ref 136–145)
TOTAL PROTEIN: 6.7 G/DL (ref 6.4–8.2)
WBC # BLD: 12.3 K/UL (ref 4–11)

## 2021-11-07 PROCEDURE — 80053 COMPREHEN METABOLIC PANEL: CPT

## 2021-11-07 PROCEDURE — 1200000000 HC SEMI PRIVATE

## 2021-11-07 PROCEDURE — 6360000002 HC RX W HCPCS: Performed by: INTERNAL MEDICINE

## 2021-11-07 PROCEDURE — 94640 AIRWAY INHALATION TREATMENT: CPT

## 2021-11-07 PROCEDURE — 6370000000 HC RX 637 (ALT 250 FOR IP): Performed by: FAMILY MEDICINE

## 2021-11-07 PROCEDURE — 94680 O2 UPTK RST&XERS DIR SIMPLE: CPT

## 2021-11-07 PROCEDURE — 36415 COLL VENOUS BLD VENIPUNCTURE: CPT

## 2021-11-07 PROCEDURE — 97530 THERAPEUTIC ACTIVITIES: CPT

## 2021-11-07 PROCEDURE — 97161 PT EVAL LOW COMPLEX 20 MIN: CPT

## 2021-11-07 PROCEDURE — 6370000000 HC RX 637 (ALT 250 FOR IP): Performed by: INTERNAL MEDICINE

## 2021-11-07 PROCEDURE — 2700000000 HC OXYGEN THERAPY PER DAY

## 2021-11-07 PROCEDURE — 94761 N-INVAS EAR/PLS OXIMETRY MLT: CPT

## 2021-11-07 PROCEDURE — 2580000003 HC RX 258: Performed by: INTERNAL MEDICINE

## 2021-11-07 PROCEDURE — 85025 COMPLETE CBC W/AUTO DIFF WBC: CPT

## 2021-11-07 RX ADMIN — BARICITINIB 4 MG: 2 TABLET, FILM COATED ORAL at 08:45

## 2021-11-07 RX ADMIN — Medication 2 PUFF: at 10:01

## 2021-11-07 RX ADMIN — SODIUM CHLORIDE, PRESERVATIVE FREE 10 ML: 5 INJECTION INTRAVENOUS at 20:41

## 2021-11-07 RX ADMIN — Medication 2 PUFF: at 20:46

## 2021-11-07 RX ADMIN — Medication 2000 UNITS: at 08:45

## 2021-11-07 RX ADMIN — ENOXAPARIN SODIUM 40 MG: 100 INJECTION SUBCUTANEOUS at 20:40

## 2021-11-07 RX ADMIN — DEXAMETHASONE SODIUM PHOSPHATE 10 MG: 10 INJECTION INTRAMUSCULAR; INTRAVENOUS at 13:44

## 2021-11-07 RX ADMIN — ENOXAPARIN SODIUM 40 MG: 100 INJECTION SUBCUTANEOUS at 08:45

## 2021-11-07 RX ADMIN — ASPIRIN 81 MG: 81 TABLET, CHEWABLE ORAL at 08:45

## 2021-11-07 RX ADMIN — IPRATROPIUM BROMIDE AND ALBUTEROL 1 PUFF: 20; 100 SPRAY, METERED RESPIRATORY (INHALATION) at 20:46

## 2021-11-07 RX ADMIN — SODIUM CHLORIDE, PRESERVATIVE FREE 10 ML: 5 INJECTION INTRAVENOUS at 08:45

## 2021-11-07 RX ADMIN — IPRATROPIUM BROMIDE AND ALBUTEROL 1 PUFF: 20; 100 SPRAY, METERED RESPIRATORY (INHALATION) at 08:50

## 2021-11-07 RX ADMIN — IPRATROPIUM BROMIDE AND ALBUTEROL 1 PUFF: 20; 100 SPRAY, METERED RESPIRATORY (INHALATION) at 12:33

## 2021-11-07 ASSESSMENT — PAIN SCALES - GENERAL
PAINLEVEL_OUTOF10: 0

## 2021-11-07 NOTE — PLAN OF CARE
Problem: Airway Clearance - Ineffective  Goal: Achieve or maintain patent airway  11/7/2021 1735 by Thea Summers RN  Outcome: Ongoing  11/7/2021 0527 by Ananya Jansen RN  Outcome: Ongoing     Problem: Gas Exchange - Impaired  Goal: Absence of hypoxia  11/7/2021 1735 by Thea Summers RN  Outcome: Ongoing  11/7/2021 0527 by Ananya Jansen RN  Outcome: Ongoing  Goal: Promote optimal lung function  11/7/2021 1735 by Thea Summers RN  Outcome: Ongoing  11/7/2021 0527 by Ananya Jansen RN  Outcome: Ongoing     Problem: Breathing Pattern - Ineffective  Goal: Ability to achieve and maintain a regular respiratory rate  11/7/2021 1735 by Thea Summers RN  Outcome: Ongoing  11/7/2021 0527 by Ananya Jansen RN  Outcome: Ongoing     Problem:  Body Temperature -  Risk of, Imbalanced  Goal: Ability to maintain a body temperature within defined limits  11/7/2021 1735 by Thea Summers RN  Outcome: Ongoing  11/7/2021 0527 by Ananya Jansen RN  Outcome: Ongoing  Goal: Will regain or maintain usual level of consciousness  11/7/2021 1735 by Thea Summers RN  Outcome: Ongoing  11/7/2021 0527 by Ananya Jansen RN  Outcome: Ongoing  Goal: Complications related to the disease process, condition or treatment will be avoided or minimized  11/7/2021 1735 by Thea Summers RN  Outcome: Ongoing  11/7/2021 0527 by Ananya Jansen RN  Outcome: Ongoing     Problem: Isolation Precautions - Risk of Spread of Infection  Goal: Prevent transmission of infection  11/7/2021 1735 by Thea Summers RN  Outcome: Ongoing  11/7/2021 0527 by Ananya Jansen RN  Outcome: Ongoing     Problem: Nutrition Deficits  Goal: Optimize nutritional status  11/7/2021 1735 by Thea Summers RN  Outcome: Ongoing  11/7/2021 0527 by Ananya Jansen RN  Outcome: Ongoing     Problem: Risk for Fluid Volume Deficit  Goal: Maintain normal heart rhythm  11/7/2021 1735 by Thea Summers RN  Outcome: Ongoing  11/7/2021 0527 by Ananya Jansen RN  Outcome: Ongoing  Goal:

## 2021-11-07 NOTE — PROGRESS NOTES
Hospitalist Progress Note      PCP: ASHOK Villalpando - CNP    Date of Admission: 11/1/2021    Chief Complaint: SOB    Hospital Course:      Subjective: more energetic today      Medications:  Reviewed    Infusion Medications    sodium chloride Stopped (11/01/21 1759)     Scheduled Medications    albuterol-ipratropium  1 puff Inhalation TID    baricitinib  4 mg Oral Daily    budesonide-formoterol  2 puff Inhalation BID    sodium chloride flush  5-40 mL IntraVENous 2 times per day    enoxaparin  40 mg SubCUTAneous BID    dexamethasone  10 mg IntraVENous Q24H    Vitamin D  2,000 Units Oral Daily    influenza virus vaccine  0.5 mL IntraMUSCular Prior to discharge    aspirin  81 mg Oral Daily     PRN Meds: sodium chloride flush, sodium chloride, ondansetron **OR** ondansetron, acetaminophen **OR** acetaminophen, benzonatate      Intake/Output Summary (Last 24 hours) at 11/7/2021 0949  Last data filed at 11/6/2021 2045  Gross per 24 hour   Intake 480 ml   Output --   Net 480 ml       Exam:    /74   Pulse 61   Temp 98.1 °F (36.7 °C) (Oral)   Resp 18   Ht 5' 3\" (1.6 m)   Wt 290 lb 12.6 oz (131.9 kg)   SpO2 97%   BMI 51.51 kg/m²     General appearance: No apparent distress, appears stated age and cooperative. HEENT: Pupils equal, round, and reactive to light. Conjunctivae/corneas clear. Neck: Supple, with full range of motion. No jugular venous distention. Trachea midline. Respiratory:  Normal respiratory effort. Clear to auscultation, bilaterally without Rales/Wheezes/Rhonchi. Cardiovascular: Regular rate and rhythm with normal S1/S2 without murmurs, rubs or gallops. Abdomen: Soft, non-tender, non-distended with normal bowel sounds. Musculoskeletal: No clubbing, cyanosis or edema bilaterally. Full range of motion without deformity. Skin: Skin color, texture, turgor normal.  No rashes or lesions. Neurologic:  Neurovascularly intact without any focal sensory/motor deficits.  Cranial nerves: II-XII intact, grossly non-focal.  Psychiatric: Alert and oriented, thought content appropriate, normal insight  Capillary Refill: Brisk,< 3 seconds   Peripheral Pulses: +2 palpable, equal bilaterally       Labs:   Recent Labs     11/05/21  1530 11/06/21  1221 11/07/21  0614   WBC 8.9 10.6 12.3*   HGB 12.7 13.2 13.4   HCT 38.2 41.2 41.5    306 300     Recent Labs     11/05/21  1530 11/06/21  1221 11/07/21  0613    137 139   K 3.7 4.4 4.0    99 100   CO2 31 25 25   BUN 10 9 11   CREATININE 0.6 0.6 0.5*   CALCIUM 8.9 9.1 8.8     Recent Labs     11/05/21  1530 11/06/21  1221 11/07/21  0613   AST 17 25 14*   ALT 27 30 29   BILITOT <0.2 0.3 <0.2   ALKPHOS 38* 36* 48     No results for input(s): INR in the last 72 hours. No results for input(s): Debra Prost in the last 72 hours. Urinalysis:      Lab Results   Component Value Date    NITRU Negative 10/01/2021    WBCUA 8 07/30/2020    BACTERIA 4+ 11/16/2015    RBCUA 1 07/30/2020    BLOODU Negative 10/01/2021    SPECGRAV 1.025 10/01/2021    GLUCOSEU Negative 10/01/2021       Radiology:  XR CHEST PORTABLE   Final Result   Worsening COVID-19 pneumonia. CT CHEST PULMONARY EMBOLISM W CONTRAST   Final Result   1. Technically limited pulmonary embolism study as detailed above. 2. No large filling defect centrally, and there are no secondary signs of   pulmonary embolism. 3. Diffuse multifocal ground-glass airspace opacities in a pattern that would   favor COVID-19 pneumonia. XR CHEST (2 VW)   Final Result   Asymmetric perihilar opacities on the right may represent vascular congestion   or a developing pattern of pneumonitis.                  Assessment/Plan:    Active Hospital Problems    Diagnosis Date Noted    Acute respiratory failure with hypoxia (HCC) [J96.01]     Mild intermittent asthma with exacerbation [J45.21]     BMI 50.0-59.9, adult ([facility] [address] [Z68.43]     Pneumonia due to COVID-19 virus [U07.1, J12.82] 11/01/2021 Acute respiratory failure due to Covid, asthma exacerbation:  - steroids. Baricitinib, remdesivir per pharmacy  - combivent, not able to do duonebs on Covid patients per pharmacy  - symbicort started  - wean to RA as tolerated  - encourage ambulation to help hypoxia  - given decreased mobility and high BMI, will send low dose Eliquis to pharmacy for two week course after discharge    Morbid obesity:  - increases index of severity    Tocacco and MJ abuse:  - cessation counseling done    DVT Prophylaxis: lovenox  Diet: ADULT DIET; Regular  Code Status: Full Code    PT/OT Eval Status: ordered    Dispo - PCU, patient encouagement to ambulate and be proactive about inhaler usage.   Can D/C when patient more proactive about improvement    Yonny Jain MD

## 2021-11-07 NOTE — PROGRESS NOTES
Physical Therapy    Facility/Department: [address] PROGRESSIVE CARE  Initial Assessment/Discharge Summary    NAME: Julee Soriano  : 1995  MRN: [id number]    Date of Service: 2021    Discharge Recommendations:  Home with assist PRN   PT Equipment Recommendations  Equipment Needed: No    Assessment   Body structures, Functions, Activity limitations: Decreased endurance  Assessment: 26 y/o female admit 2021 with Pneumonia, Viral Sepsis,COVID +. PMH as noted including Asthma, Bronchitis. Wgt : 290 lb. PTA pt living with family in multi level home with 2nd floor bed/bath; independent daily care and functional mobility. Pt reports assist/support upon d/c.O2 1/2 L prior oob activities. Following short distances within hospital room setting, sats 88%. RT informed. Gait steady. Encourage increase activity. No further PT indicated at this time. Prognosis: Good  Decision Making: Low Complexity  History: 26 y/o female admit 2021 with Pneumonia, Viral Sepsis,COVID +. PMH as noted including Asthma, Bronchitis. Wgt : 290 lb. Exam: See above. Clinical Presentation: See above. Patient Education: Role of PT, POC, Need to call for assist.  Barriers to Learning: Needs encouragement for increase activity. REQUIRES PT FOLLOW UP: No  Activity Tolerance  Activity Tolerance: Patient Tolerated treatment well  Activity Tolerance: O2 1/2 L prior oob activities. Following short distances within hospital room setting, sats 88%. RT informed. Gait steady. Encourage increase activity. Patient Diagnosis(es): The primary encounter diagnosis was Viral sepsis ([facility] [address] Diagnoses of Pneumonia due to COVID-19 virus and Dyspnea and respiratory abnormalities were also pertinent to this visit. has a past medical history of Asthma, Bronchitis, and Bronchitis. has no past surgical history on file.     Restrictions  Restrictions/Precautions  Restrictions/Precautions: Up as Tolerated, Isolation  Position Activity Restriction  Other position/activity restrictions: Droplet Plus Precautions :  COVID +. O2 1L via NC. Sats % while in bed. RT to bedside, decrease to 1/2 L. Following oob activities to chair sats 88%. RT notified. Vision/Hearing  Vision: Within Functional Limits  Hearing: Within functional limits     Subjective  General  Chart Reviewed: Yes  Patient assessed for rehabilitation services?: Yes  Additional Pertinent Hx: 28 y/o female admit 11/1/2021 with Pneumonia, Viral Sepsis,COVID +. PMH as noted including Asthma, Bronchitis. Wgt : 290 lb. Family / Caregiver Present: No  Referring Practitioner: Dr. Lv Valdes  Diagnosis: Pneumonia, Viral Sepsis, COVID+. Follows Commands: Within Functional Limits  Subjective  Subjective: Pt agreeable to PT Eval/Rx. Slow/flat affect. Pain Screening  Patient Currently in Pain: Denies          Orientation  Orientation  Overall Orientation Status: Within Functional Limits  Social/Functional History  Social/Functional History  Lives With: Family  Type of Home: House  Home Layout: Multi-level, Bed/Bath upstairs  Home Access: Stairs to enter with rails (5+5 steps to enter.)  Bathroom Shower/Tub: Tub/Shower unit  Bathroom Toilet: Standard  Bathroom Accessibility: Accessible  ADL Assistance: Independent  Ambulation Assistance: Independent (Without assist device pta.)  Transfer Assistance: Independent  Active : Yes  Type of occupation: Works : [employer]. Cognition   Cognition  Overall Cognitive Status: [facility]  Cognition Comment: Somewhat flat affect; slow, needs encouragement for increase activity.     Objective          AROM RLE (degrees)  RLE AROM: WFL  AROM LLE (degrees)  LLE AROM : WFL  AROM RUE (degrees)  RUE AROM : WFL  AROM LUE (degrees)  LUE AROM : WFL  Strength RLE  Strength RLE: WFL  Strength LLE  Strength LLE: WFL  Strength RUE  Strength RUE: WFL  Strength LUE  Strength LUE: WFL     Sensation  Overall Sensation Status: [facility]  Bed mobility  Supine to Sit: Independent  Transfers  Sit to Stand: Independent  Stand to sit: Independent  Ambulation  Ambulation?: Yes  Ambulation 1  Surface: level tile  Device: No Device  Distance: Pt amb within hospital room setting Independently without assist device. No LOB or deviations noted. Slow, steady. Plan   Plan  Times per week: D/C Acute Care PT Services. Safety Devices  Type of devices: Call light within reach, Left in chair, Nurse notified                 AM-PAC Score  AM-PAC Inpatient Mobility Raw Score : 24 (11/07/21 0920)  AM-PAC Inpatient T-Scale Score : 61.14 (11/07/21 0920)  Mobility Inpatient CMS 0-100% Score: 0 (11/07/21 0920)  Mobility Inpatient CMS G-Code Modifier : [address] (11/07/21 0920)          Goals  Short term goals  Time Frame for Short term goals: No Acute Care PT Goals Identified. Patient Goals   Patient goals : Go home.        Therapy Time   Individual Concurrent Group Co-treatment   Time In 0845         Time Out 0915         Minutes [address], PT

## 2021-11-07 NOTE — PROGRESS NOTES
Respiratory Therapy  Home Oxygen Evaluation    SPO2 on RA at rest 94%  SPO2 on RA with ambulation  increased to 99%  Pt states that she has no increase in WOB with ambulation  Pt has an Albuterol rescue inhaler and spacer at home for PRN use  Pt does not qualify for Home Oxygen at this time'  Will continue to monitor for desaturations    Electronically signed by Korey Virgen RCP on 11/7/2021 at 12:32 PM

## 2021-11-08 VITALS
HEART RATE: 77 BPM | OXYGEN SATURATION: 95 % | BODY MASS INDEX: 51.72 KG/M2 | WEIGHT: 291.89 LBS | TEMPERATURE: 98.4 F | SYSTOLIC BLOOD PRESSURE: 146 MMHG | RESPIRATION RATE: 18 BRPM | HEIGHT: 63 IN | DIASTOLIC BLOOD PRESSURE: 90 MMHG

## 2021-11-08 LAB
A/G RATIO: 1.1 (ref 1.1–2.2)
ALBUMIN SERPL-MCNC: 3.7 G/DL (ref 3.4–5)
ALP BLD-CCNC: 56 U/L (ref 40–129)
ALT SERPL-CCNC: 32 U/L (ref 10–40)
ANION GAP SERPL CALCULATED.3IONS-SCNC: 13 MMOL/L (ref 3–16)
AST SERPL-CCNC: 16 U/L (ref 15–37)
BASOPHILS ABSOLUTE: 0 K/UL (ref 0–0.2)
BASOPHILS RELATIVE PERCENT: 0 %
BILIRUB SERPL-MCNC: <0.2 MG/DL (ref 0–1)
BUN BLDV-MCNC: 11 MG/DL (ref 7–20)
CALCIUM SERPL-MCNC: 9.1 MG/DL (ref 8.3–10.6)
CHLORIDE BLD-SCNC: 101 MMOL/L (ref 99–110)
CO2: 23 MMOL/L (ref 21–32)
CREAT SERPL-MCNC: 0.5 MG/DL (ref 0.6–1.1)
EOSINOPHILS ABSOLUTE: 0 K/UL (ref 0–0.6)
EOSINOPHILS RELATIVE PERCENT: 0 %
GFR AFRICAN AMERICAN: >60
GFR NON-AFRICAN AMERICAN: >60
GLUCOSE BLD-MCNC: 130 MG/DL (ref 70–99)
GLUCOSE BLD-MCNC: 143 MG/DL (ref 70–99)
GLUCOSE BLD-MCNC: 99 MG/DL (ref 70–99)
HCT VFR BLD CALC: 40.5 % (ref 36–48)
HEMOGLOBIN: 13.4 G/DL (ref 12–16)
LYMPHOCYTES ABSOLUTE: 2.6 K/UL (ref 1–5.1)
LYMPHOCYTES RELATIVE PERCENT: 18 %
MCH RBC QN AUTO: 27 PG (ref 26–34)
MCHC RBC AUTO-ENTMCNC: 33.1 G/DL (ref 31–36)
MCV RBC AUTO: 81.4 FL (ref 80–100)
MONOCYTES ABSOLUTE: 1.3 K/UL (ref 0–1.3)
MONOCYTES RELATIVE PERCENT: 9 %
MYELOCYTE PERCENT: 2 %
NEUTROPHILS ABSOLUTE: 10.4 K/UL (ref 1.7–7.7)
NEUTROPHILS RELATIVE PERCENT: 71 %
PDW BLD-RTO: 14 % (ref 12.4–15.4)
PERFORMED ON: ABNORMAL
PERFORMED ON: NORMAL
PLATELET # BLD: 306 K/UL (ref 135–450)
PLATELET SLIDE REVIEW: ADEQUATE
PMV BLD AUTO: 9 FL (ref 5–10.5)
POTASSIUM SERPL-SCNC: 4 MMOL/L (ref 3.5–5.1)
RBC # BLD: 4.97 M/UL (ref 4–5.2)
RBC # BLD: NORMAL 10*6/UL
SLIDE REVIEW: ABNORMAL
SODIUM BLD-SCNC: 137 MMOL/L (ref 136–145)
TOTAL PROTEIN: 7 G/DL (ref 6.4–8.2)
WBC # BLD: 14.3 K/UL (ref 4–11)

## 2021-11-08 PROCEDURE — 80053 COMPREHEN METABOLIC PANEL: CPT

## 2021-11-08 PROCEDURE — 85025 COMPLETE CBC W/AUTO DIFF WBC: CPT

## 2021-11-08 PROCEDURE — 36415 COLL VENOUS BLD VENIPUNCTURE: CPT

## 2021-11-08 PROCEDURE — 6360000002 HC RX W HCPCS: Performed by: INTERNAL MEDICINE

## 2021-11-08 PROCEDURE — 99231 SBSQ HOSP IP/OBS SF/LOW 25: CPT | Performed by: INTERNAL MEDICINE

## 2021-11-08 PROCEDURE — 6370000000 HC RX 637 (ALT 250 FOR IP): Performed by: FAMILY MEDICINE

## 2021-11-08 PROCEDURE — 2580000003 HC RX 258: Performed by: INTERNAL MEDICINE

## 2021-11-08 PROCEDURE — 6370000000 HC RX 637 (ALT 250 FOR IP): Performed by: INTERNAL MEDICINE

## 2021-11-08 PROCEDURE — 94761 N-INVAS EAR/PLS OXIMETRY MLT: CPT

## 2021-11-08 PROCEDURE — 94640 AIRWAY INHALATION TREATMENT: CPT

## 2021-11-08 RX ORDER — CALCIUM CITRATE/VITAMIN D3 200MG-6.25
1 TABLET ORAL DAILY
Qty: 100 EACH | Refills: 3 | Status: SHIPPED | OUTPATIENT
Start: 2021-11-08

## 2021-11-08 RX ORDER — BLOOD-GLUCOSE METER
1 KIT MISCELLANEOUS DAILY
Qty: 1 KIT | Refills: 0 | Status: SHIPPED | OUTPATIENT
Start: 2021-11-08

## 2021-11-08 RX ORDER — BUDESONIDE AND FORMOTEROL FUMARATE DIHYDRATE 160; 4.5 UG/1; UG/1
2 AEROSOL RESPIRATORY (INHALATION) 2 TIMES DAILY
Qty: 10.2 G | Refills: 3 | Status: SHIPPED | OUTPATIENT
Start: 2021-11-08

## 2021-11-08 RX ORDER — IPRATROPIUM BROMIDE AND ALBUTEROL SULFATE 2.5; .5 MG/3ML; MG/3ML
1 SOLUTION RESPIRATORY (INHALATION) EVERY 6 HOURS PRN
Qty: 360 ML | Refills: 0 | Status: SHIPPED | OUTPATIENT
Start: 2021-11-08

## 2021-11-08 RX ORDER — DEXAMETHASONE 6 MG/1
6 TABLET ORAL 2 TIMES DAILY WITH MEALS
Qty: 6 TABLET | Refills: 0 | Status: SHIPPED | OUTPATIENT
Start: 2021-11-08 | End: 2021-11-11

## 2021-11-08 RX ADMIN — ASPIRIN 81 MG: 81 TABLET, CHEWABLE ORAL at 09:06

## 2021-11-08 RX ADMIN — DEXAMETHASONE SODIUM PHOSPHATE 10 MG: 10 INJECTION INTRAMUSCULAR; INTRAVENOUS at 14:43

## 2021-11-08 RX ADMIN — BARICITINIB 4 MG: 2 TABLET, FILM COATED ORAL at 09:06

## 2021-11-08 RX ADMIN — Medication 1 PUFF: at 13:54

## 2021-11-08 RX ADMIN — Medication 2000 UNITS: at 09:06

## 2021-11-08 RX ADMIN — Medication 2 PUFF: at 08:12

## 2021-11-08 RX ADMIN — ENOXAPARIN SODIUM 40 MG: 100 INJECTION SUBCUTANEOUS at 09:06

## 2021-11-08 RX ADMIN — SODIUM CHLORIDE, PRESERVATIVE FREE 10 ML: 5 INJECTION INTRAVENOUS at 09:06

## 2021-11-08 RX ADMIN — IPRATROPIUM BROMIDE AND ALBUTEROL 1 PUFF: 20; 100 SPRAY, METERED RESPIRATORY (INHALATION) at 08:11

## 2021-11-08 ASSESSMENT — PAIN SCALES - GENERAL
PAINLEVEL_OUTOF10: 0
PAINLEVEL_OUTOF10: 0

## 2021-11-08 NOTE — CARE COORDINATION
[facility]  Diabetes Education   Progress Note       NAME:  Sonja Dumont RECORD NUMBER:  [id number]  AGE: 27 y.o. GENDER: female  : 1995  TODAY'S DATE:  2021    Subjective   Reason for Diabetic Education Evaluation and Assessment: new onset diabetes     Telephonic education session. Mayte Banks is planning discharge today. She reports that she is interested in weight loss and recently stopped drinking regular soda. Visit Type: evaluation      Jadyn Pennington is a 27 y.o. female referred by:     [x] Physician  [] Nursing  [] Chart Review   [] Other:     PAST MEDICAL HISTORY        Diagnosis Date    Asthma     Bronchitis     Bronchitis        PAST SURGICAL HISTORY    History reviewed. No pertinent surgical history. FAMILY HISTORY    History reviewed. No pertinent family history. SOCIAL HISTORY    Social History     Tobacco Use    Smoking status: Former Smoker    Smokeless tobacco: Never Used   Vaping Use    Vaping Use: Never used   Substance Use Topics    Alcohol use: Yes     Comment: occ    Drug use: Yes     Types: Marijuana (Weed)       ALLERGIES    No Known Allergies    MEDICATIONS     baricitinib  4 mg Oral Daily    budesonide-formoterol  2 puff Inhalation BID    sodium chloride flush  5-40 mL IntraVENous 2 times per day    enoxaparin  40 mg SubCUTAneous BID    dexamethasone  10 mg IntraVENous Q24H    Vitamin D  2,000 Units Oral Daily    influenza virus vaccine  0.5 mL IntraMUSCular Prior to discharge    aspirin  81 mg Oral Daily       Objective        Patient Active Problem List   Diagnosis Code    Periodic headache syndrome, not intractable G43. C0    Class 3 severe obesity due to excess calories with body mass index (BMI) of 45.0 to 49.9 in adult ([facility]) E66.01, Z68.42    Mild intermittent asthma J45.20    Amenorrhea N91.2    Pneumonia due to COVID-19 virus U07.1, J12.82    Acute respiratory failure with hypoxia ([facility]) J96.01    Mild intermittent asthma with exacerbation J45.21    BMI 50.0-59.9, adult ([facility]) Z68.43        BP (!) 146/90   Pulse 77   Temp 98.4 °F (36.9 °C) (Oral)   Resp 18   Ht 5' 3\" (1.6 m)   Wt 291 lb 14.2 oz (132.4 kg)   SpO2 95%   BMI 51.71 kg/m²     HgBA1c:    Lab Results   Component Value Date    LABA1C 6.8 11/01/2021       Recent Labs     11/07/21  1120 11/07/21  1707 11/07/21  1959 11/08/21  0804   POCGLU 131* 250* 344* 99       BUN/Creatinine:    Lab Results   Component Value Date    BUN 11 11/08/2021    CREATININE 0.5 11/08/2021       Assessment        Diabetes Management and Education    Does the patient have a Primary Care Physician? Yes, ASHOK Harrington - CNP       Does the patient require new medication instruction? TBD  Discussed impact of steroids on her blood sugar. Level of patient/caregiver understanding able to:       [x] Verbalized Understanding   [] Demonstrated Understanding       [] Teach Back       [] Needs Reinforcement     []  Other:        Does the patient/caregiver monitor Blood Glucoses? No:     Does the patient/caregiver follow a Meal Plan? No: Started to make changes prior to hospitalization. Recommend making water, unsweetened tea or coffee primary drinks. Reviewed importance of eating three meals per day and plate method for consistent carb intake. Level of patient/caregiver understanding able to:       [x] Verbalized Understanding   [] Demonstrated Understanding       [] Teach Back       [] Needs Reinforcement     []  Other:                    Does the patient/caregiver understand S/S of Hyperglycemia? No:     Reviewed symptoms, prevention and treatment. Level of patient/caregiver understanding able to:        [x] Verbalized Understanding   [] Demonstrated Understanding       [] Teach Back       [] Needs Reinforcement     []  Other:           Plan        Ongoing diabetes education and blood glucose monitoring. Recommend referral to [employer]. The following educational and support materials were provided:  · My contact information       · The Diabetes Education Program:  Planning Healthy Meals   · Academy of Nutrition and Dietetics handout - Carbohydrate Counting for People with Diabetes                                           Discharge Plan:  Discharge needs: glucometer/strips/lancets  Placement for patient upon discharge: independent living        Teaching Time Diabetes Education:  20 minutes     Electronically signed by Marva Morejon on 11/8/2021 at 11:57 AM

## 2021-11-08 NOTE — PROGRESS NOTES
Pulmonary Critical Care Progress Note     Patient's name:  Lyudmila اللعي  Medical Record Number: [id number]  Patient's account/billing number: [de-identified]  Patient's YOB: 1995  Age: 27 y. o. Date of Admission: 11/1/2021  2:35 AM  Date of Consult: 11/8/2021      Primary Care Physician: ASHOK Salmeron CNP      Code Status: Full Code    Chief complaint: covid PNA    Assessment and Plan     1. covid PNA  2. Asthma      Plan:  OK to d/c from pulmonary stand point on Symbicort BID and Douneb neb QID as needed  Nebulizer machine prescription to DME  Follow up with pulmonary in couple of weeks        Overnight:  Sob getting better. REVIEW OF SYSTEMS:  Review of Systems -   General ROS: negative  Psychological ROS: negative  Ophthalmic ROS: negative  ENT ROS: negative  Allergy and Immunology ROS: negative  Hematological and Lymphatic ROS: negative  Endocrine ROS: negative  Breast ROS: negative  Respiratory ROS: no cough, shortness of breath, or wheezing  Cardiovascular ROS: no chest pain or dyspnea on exertion  Gastrointestinal ROS:negative  Genito-Urinary ROS: negative  Musculoskeletal ROS: negative  Neurological ROS: negative  Dermatological ROS: negative        Physical Exam:    Vitals: BP (!) 146/90   Pulse 77   Temp 98.4 °F (36.9 °C) (Oral)   Resp 18   Ht 5' 3\" (1.6 m)   Wt 291 lb 14.2 oz (132.4 kg)   SpO2 95%   BMI 51.71 kg/m²     Last Body weight:   Wt Readings from Last 3 Encounters:   11/08/21 291 lb 14.2 oz (132.4 kg)   10/25/21 291 lb 0.1 oz (132 kg)   10/15/21 296 lb 4.8 oz (134.4 kg)       Body Mass Index : Body mass index is 51.71 kg/m². Intake and Output summary:     Intake/Output Summary (Last 24 hours) at 11/8/2021 1008  Last data filed at 11/7/2021 1233  Gross per 24 hour   Intake 240 ml   Output --   Net 240 ml       Physical Examination:     Gen:  No acute distress. Eyes: PERRL. Anicteric sclera. No conjunctival injection. ENT: No discharge.  Posterior oropharynx clear. External appearance of ears and nose normal.  Neck: Trachea midline. No mass   Resp:  Diminished at the bases no wheezing or rhonchi   CV: Regular rate. Regular rhythm. No murmur or rub. No edema. GI: Soft, Non-tender. Non-distended. +BS  Skin: Warm, dry, w/o erythema. Lymph: No cervical or supraclavicular LAD. M/S: No cyanosis. No clubbing. Neuro:  CN 2-12 tested, no focal neurologic deficit. Moves all extremities  Psych: Awake and alert, Oriented x 3. Judgement and insight appropriate. Mood stable. Laboratory findings:-    CBC:   Recent Labs     11/08/21  0604   WBC 14.3*   HGB 13.4        BMP:    Recent Labs     11/06/21  1221 11/06/21  1221 11/07/21  0613 11/07/21  0613 11/08/21  0603      < > 139   < > 137   K 4.4   < > 4.0   < > 4.0   CL 99   < > 100   < > 101   CO2 25   < > 25   < > 23   BUN 9   < > 11   < > 11   CREATININE 0.6  --  0.5*  --  0.5*   GLUCOSE 154*   < > 160*   < > 143*    < > = values in this interval not displayed. S. Calcium:  Recent Labs     11/08/21  0603   CALCIUM 9.1     S. Ionized Calcium:No results for input(s): IONCA in the last 72 hours. S. Magnesium:No results for input(s): MG in the last 72 hours. S. Phosphorus:No results for input(s): PHOS in the last 72 hours. S. Glucose:  Recent Labs     11/07/21  1707 11/07/21  1959 11/08/21  0804   POCGLU 250* 344* 99           Radiology Review:  Pertinent images / reports were reviewed as a part of this visit.                      Issa Arceo MD, M.D.            11/8/2021, 10:08 AM

## 2021-11-08 NOTE — CARE COORDINATION
[facility] received referral from [facility] for Self-Pay Nebulilzer. [facility] rep delivered the ordered Nebulizer to the patient and reviewed self-pay cost, equipment setup, care and maintenance, and supply reorder process with patient. RN present and aware.     Thank you for the referral.  Electronically signed by Akiko Crawley on 11/8/2021 at 3:56 PM Cell ph# [phone]

## 2021-11-08 NOTE — PROGRESS NOTES
CLINICAL PHARMACY NOTE: MEDS TO BEDS    Total # of Prescriptions Filled: 8   The following medications were delivered to the patient:  Current Discharge Medication List      START taking these medications    Details   metFORMIN (GLUCOPHAGE) 500 MG tablet Take 1 tablet by mouth 2 times daily (with meals)  Qty: 180 tablet, Refills: 1      budesonide-formoterol (SYMBICORT) 160-4.5 MCG/ACT AERO Inhale 2 puffs into the lungs 2 times daily  Qty: 10.2 g, Refills: 3      blood glucose test strips (TRUE METRIX BLOOD GLUCOSE TEST) strip 1 each by In Vitro route daily As needed. Qty: 100 each, Refills: 3    Comments: Pharmacist can substitute test strips per formulary. glucose monitoring (FREESTYLE FREEDOM) kit 1 kit by Does not apply route daily  Qty: 1 kit, Refills: 0      ipratropium-albuterol (DUONEB) 0.5-2.5 (3) MG/3ML SOLN nebulizer solution Inhale 3 mLs into the lungs every 6 hours as needed for Shortness of Breath  Qty: 360 mL, Refills: 0      dexamethasone (DECADRON) 6 MG tablet Take 1 tablet by mouth 2 times daily (with meals) for 3 days  Qty: 6 tablet, Refills: 0      apixaban (ELIQUIS) 2.5 MG TABS tablet Take 1 tablet by mouth 2 times daily for 14 days For Covid blood clot prevention.   Qty: 28 tablet, Refills: 0         · Lancets  ·     Additional Documentation:

## 2021-11-08 NOTE — DISCHARGE SUMMARY
Hospital Medicine Discharge Summary    Patient ID: Dorothy Hammer      Patient's PCP: Rebecca Lawrence, APRN - CNP    Admit Date: 11/1/2021     Discharge Date:   11/8/2021    Admitting Physician: Bruno Rinne, MD     Discharge Physician: Ralph Vogt MD     Discharge Diagnoses: Active Hospital Problems    Diagnosis Date Noted    Acute respiratory failure with hypoxia ([facility] [J96.01]     Mild intermittent asthma with exacerbation [J45.21]     BMI 50.0-59.9, adult ([facility] [address] [Z68.43]     Pneumonia due to COVID-19 virus [U07.1, J12.82] 11/01/2021       The patient was seen and examined on day of discharge and this discharge summary is in conjunction with any daily progress note from day of discharge. Hospital Course:     Acute respiratory failure due to Covid, asthma exacerbation:  - steroids. Baricitinib, remdesivir per pharmacy  - combivent, not able to do duonebs on Covid patients per pharmacy  - symbicort started  - wean to RA as tolerated  - encourage ambulation to help hypoxia  - given decreased mobility and high BMI, will send low dose Eliquis to pharmacy for two week course after discharge    Discharge with Duonebs PRN, symbicort. Eliquis for DVT ppx. DM2:  - Hgb A1c 6.7  - DM2 nurse education saw patient  - discharge with glucometer and metformin     Morbid obesity:  - increases index of severity     Tocacco and MJ abuse:  - cessation counseling done      Exam:     BP (!) 146/90   Pulse 77   Temp 98.4 °F (36.9 °C) (Oral)   Resp 18   Ht 5' 3\" (1.6 m)   Wt 291 lb 14.2 oz (132.4 kg)   SpO2 95%   BMI 51.71 kg/m²     General appearance: No apparent distress, appears stated age and cooperative. HEENT: Pupils equal, round, and reactive to light. Conjunctivae/corneas clear. Neck: Supple, with full range of motion. No jugular venous distention. Trachea midline. Respiratory:  Normal respiratory effort.  Clear to auscultation, bilaterally without Rales/Wheezes/Rhonchi. Cardiovascular: Regular rate and rhythm with normal S1/S2 without murmurs, rubs or gallops. Abdomen: Soft, non-tender, non-distended with normal bowel sounds. Musculoskelatal: No clubbing, cyanosis or edema bilaterally. Full range of motion without deformity. Skin: Skin color, texture, turgor normal.  No rashes or lesions. Neurologic:  Neurovascularly intact without any focal sensory/motor deficits. Cranial nerves: II-XII intact, grossly non-focal.  Psychiatric: Alert and oriented, thought content appropriate, normal insight      Consults:     IP CONSULT TO PULMONOLOGY  IP CONSULT TO DIABETES EDUCATOR    Significant Diagnostic Studies:         Radiology:  XR CHEST PORTABLE   Final Result   Worsening COVID-19 pneumonia.           CT CHEST PULMONARY EMBOLISM W CONTRAST   Final Result   1. Technically limited pulmonary embolism study as detailed above. 2. No large filling defect centrally, and there are no secondary signs of   pulmonary embolism. 3. Diffuse multifocal ground-glass airspace opacities in a pattern that would   favor COVID-19 pneumonia.           XR CHEST (2 VW)   Final Result   Asymmetric perihilar opacities on the right may represent vascular congestion   or a developing pattern of pneumonitis.             PCP/SNF to follow up: COPD management    Disposition:  Home    Condition on D/C:  Stable     Discharge Instructions/Follow-up:  F/u with PCP within 1 week    Code Status:  Full Code     Activity: activity as tolerated    Diet: diabetic diet    Labs:  For convenience and continuity at follow-up the following most recent labs are provided:      CBC:    Lab Results   Component Value Date    WBC 14.3 11/08/2021    HGB 13.4 11/08/2021    HCT 40.5 11/08/2021     11/08/2021       Renal:    Lab Results   Component Value Date     11/08/2021    K 4.0 11/08/2021    K 3.9 11/02/2021     11/08/2021    CO2 23 11/08/2021    BUN 11 11/08/2021    CREATININE 0.5 11/08/2021    CALCIUM 9.1 11/08/2021       Discharge Medications:     Current Discharge Medication List           Details   metFORMIN (GLUCOPHAGE) 500 MG tablet Take 1 tablet by mouth 2 times daily (with meals)  Qty: 180 tablet, Refills: 1      budesonide-formoterol (SYMBICORT) 160-4.5 MCG/ACT AERO Inhale 2 puffs into the lungs 2 times daily  Qty: 10.2 g, Refills: 3      blood glucose test strips (TRUE METRIX BLOOD GLUCOSE TEST) strip 1 each by In Vitro route daily As needed. Qty: 100 each, Refills: 3    Comments: Pharmacist can substitute test strips per formulary. glucose monitoring (FREESTYLE FREEDOM) kit 1 kit by Does not apply route daily  Qty: 1 kit, Refills: 0      ipratropium-albuterol (DUONEB) 0.5-2.5 (3) MG/3ML SOLN nebulizer solution Inhale 3 mLs into the lungs every 6 hours as needed for Shortness of Breath  Qty: 360 mL, Refills: 0      dexamethasone (DECADRON) 6 MG tablet Take 1 tablet by mouth 2 times daily (with meals) for 3 days  Qty: 6 tablet, Refills: 0      apixaban (ELIQUIS) 2.5 MG TABS tablet Take 1 tablet by mouth 2 times daily for 14 days For Covid blood clot prevention. Qty: 28 tablet, Refills: 0              Details   acetaminophen (TYLENOL) 500 MG tablet Take 1,000 mg by mouth every 6 hours as needed for Pain or Fever             Time Spent on discharge is more than 45 minutes in the examination, evaluation, counseling and review of medications and discharge plan. Signed: Mac Benites MD   11/8/2021      Thank you ASHOK Mike - FARSHAD for the opportunity to be involved in this patient's care. If you have any questions or concerns please feel free to contact me at [phone].

## 2021-11-08 NOTE — PLAN OF CARE
Problem: Airway Clearance - Ineffective  Goal: Achieve or maintain patent airway  11/8/2021 1422 by Keagan Mcgee RN  Outcome: Met This Shift  11/8/2021 1017 by Keagan Mcgee RN  Outcome: Ongoing  11/8/2021 0644 by Lalo Barrow RN  Outcome: Ongoing     Problem: Gas Exchange - Impaired  Goal: Absence of hypoxia  11/8/2021 1422 by Keagan Mcgee RN  Outcome: Met This Shift  11/8/2021 1017 by Keagan Mcgee RN  Outcome: Ongoing  11/8/2021 0644 by Lalo Barrow RN  Outcome: Ongoing  Goal: Promote optimal lung function  11/8/2021 1422 by Keagan Mcgee RN  Outcome: Met This Shift  11/8/2021 1017 by Keagan Mcgee RN  Outcome: Ongoing  11/8/2021 0644 by Lalo Barrow RN  Outcome: Ongoing     Problem: Breathing Pattern - Ineffective  Goal: Ability to achieve and maintain a regular respiratory rate  11/8/2021 1422 by Keagan Mcgee RN  Outcome: Met This Shift  11/8/2021 1017 by Keagan Mcgee RN  Outcome: Ongoing  11/8/2021 0644 by Lalo Barrow RN  Outcome: Ongoing     Problem:  Body Temperature -  Risk of, Imbalanced  Goal: Ability to maintain a body temperature within defined limits  11/8/2021 1422 by Keagan Mcgee RN  Outcome: Met This Shift  11/8/2021 1017 by Keagan Mcgee RN  Outcome: Ongoing  11/8/2021 0644 by Lalo Barrow RN  Outcome: Ongoing  Goal: Will regain or maintain usual level of consciousness  11/8/2021 1422 by Keagan Mcgee RN  Outcome: Met This Shift  11/8/2021 1017 by Keagan Mcgee RN  Outcome: Ongoing  11/8/2021 0644 by Lalo Barrow RN  Outcome: Ongoing  Goal: Complications related to the disease process, condition or treatment will be avoided or minimized  11/8/2021 1422 by Keagan Mcgee RN  Outcome: Met This Shift  11/8/2021 1017 by Keagan Mcgee RN  Outcome: Ongoing  11/8/2021 0644 by Lalo Barrow RN  Outcome: Ongoing     Problem: Isolation Precautions - Risk of Spread of Infection  Goal: Prevent transmission of infection  11/8/2021 1422 by Cristofer Hernández RN  Outcome: Met This Shift  11/8/2021 1017 by Cristofer Hernández RN  Outcome: Ongoing  11/8/2021 0644 by Simone Quiroz RN  Outcome: Ongoing     Problem: Nutrition Deficits  Goal: Optimize nutritional status  11/8/2021 1422 by Cristofer Hernández RN  Outcome: Met This Shift  11/8/2021 1017 by Cristofer Hernández RN  Outcome: Ongoing  11/8/2021 0644 by Simone Quiroz RN  Outcome: Ongoing     Problem: Risk for Fluid Volume Deficit  Goal: Maintain normal heart rhythm  11/8/2021 1422 by Cristofer Hernández RN  Outcome: Met This Shift  11/8/2021 1017 by Cristofer Hernández RN  Outcome: Ongoing  11/8/2021 0644 by Simone Quiroz RN  Outcome: Ongoing  Goal: Maintain absence of muscle cramping  11/8/2021 1422 by Cristofer Hernández RN  Outcome: Met This Shift  11/8/2021 1017 by Cristofer Hernández RN  Outcome: Ongoing  11/8/2021 0644 by Simone Quiroz RN  Outcome: Ongoing  Goal: Maintain normal serum potassium, sodium, calcium, phosphorus, and pH  11/8/2021 1422 by Cristofer Hernández RN  Outcome: Met This Shift  11/8/2021 1017 by Cristofer Hernández RN  Outcome: Ongoing  11/8/2021 0644 by Simone Quiroz RN  Outcome: Ongoing     Problem: Loneliness or Risk for Loneliness  Goal: Demonstrate positive use of time alone when socialization is not possible  11/8/2021 1422 by Cristofer Hernández RN  Outcome: Met This Shift  11/8/2021 1017 by Cristofer Hernández RN  Outcome: Ongoing  11/8/2021 0644 by Simone Quiroz RN  Outcome: Ongoing     Problem: Fatigue  Goal: Verbalize increase energy and improved vitality  11/8/2021 1422 by Cristofer Hernández RN  Outcome: Met This Shift  11/8/2021 1017 by Cristofer Hernández RN  Outcome: Ongoing  11/8/2021 0644 by Simone Quiroz RN  Outcome: Ongoing     Problem: Patient Education: Go to Patient Education Activity  Goal: Patient/Family Education  11/8/2021 1422 by Cristofer [place], RN  Outcome: Met This Shift  11/8/2021 1017 by Cristofer Hernández, RN  Outcome: Ongoing  11/8/2021 0644 by Cherrie Mack RN  Outcome: Ongoing     Problem: Infection:  Goal: Will remain free from infection  Description: Will remain free from infection  11/8/2021 1422 by Chandler Ocasio RN  Outcome: Met This Shift  11/8/2021 1017 by Chandler Ocasio RN  Outcome: Ongoing  11/8/2021 0644 by Cherrie Mack RN  Outcome: Ongoing     Problem: Safety:  Goal: Free from accidental physical injury  Description: Free from accidental physical injury  11/8/2021 1422 by Chandler Ocasio RN  Outcome: Met This Shift  11/8/2021 1017 by Chandler Ocasio RN  Outcome: Ongoing  11/8/2021 0644 by Cherrie Mack RN  Outcome: Ongoing  Goal: Free from intentional harm  Description: Free from intentional harm  11/8/2021 1422 by Chandler Ocasio RN  Outcome: Met This Shift  11/8/2021 1017 by Chandler Ocasio RN  Outcome: Ongoing  11/8/2021 0644 by Cherrie Mack RN  Outcome: Ongoing     Problem: Daily Care:  Goal: Daily care needs are met  Description: Daily care needs are met  11/8/2021 1422 by Chandler Ocasio RN  Outcome: Met This Shift  11/8/2021 1017 by Chandler Ocasio RN  Outcome: Ongoing  11/8/2021 0644 by Cherrie Mack RN  Outcome: Ongoing     Problem: Pain:  Goal: Patient's pain/discomfort is manageable  Description: Patient's pain/discomfort is manageable  11/8/2021 1422 by Chandler Ocasio RN  Outcome: Met This Shift  11/8/2021 1017 by Chandler Ocasio RN  Outcome: Ongoing  11/8/2021 0644 by Cherrie Mack RN  Outcome: Ongoing     Problem: Skin Integrity:  Goal: Skin integrity will stabilize  Description: Skin integrity will stabilize  11/8/2021 1422 by Chandler Ocasio RN  Outcome: Met This Shift  11/8/2021 1017 by Chandler Ocasio RN  Outcome: Ongoing  11/8/2021 0644 by Cherrie Mack RN  Outcome: Ongoing     Problem: Discharge Planning:  Goal: Patients continuum of care needs are met  Description: Patients continuum of care needs are met  11/8/2021 1422 by Kenney Varela Max Miner RN  Outcome: Met This Shift  11/8/2021 1017 by Erik Valdovinos RN  Outcome: Ongoing  11/8/2021 0644 by Libra Sierra RN  Outcome: Ongoing     Problem: COMMUNICATION IMPAIRMENT  Goal: Ability to express needs and understand communication  11/8/2021 1422 by Erik Valdovinos RN  Outcome: Met This Shift  11/8/2021 1017 by Erik Valdovinos RN  Outcome: Ongoing  11/8/2021 0644 by Libra Sierra RN  Outcome: Ongoing

## 2021-11-08 NOTE — CARE COORDINATION
CASE MANAGEMENT DISCHARGE SUMMARY:  Called to patient's room due to isolation status. Discussed discharge plan. Spoke to Lorna Mendoza with [facility]. Confirmed cost of Nebulizer DME would be $100.00. Confirmed with patient they will be able to afford cost.   Notified Lorna Mendoza.      DISCHARGE DATE: 11/8/2021    DISCHARGED TO: Home with family; independent; meds to beds    TRANSPORTATION: Parents             TIME: Patient to coordinate with RN after medications are delivered    PREFERRED PHARMACY: [facility] meds to CAMI Aviles LSW, Social Work/Case Management   [phone]  Electronically signed by Marcianne Mcardle, MSW, LSW on 11/8/2021 at 2:24 PM

## 2021-11-08 NOTE — CARE COORDINATION
Discharge order acknowledged. Patient does not have health insurance. Patient    DME order for nebulizer. Called to [facility] with [place] to notify. Voicemail left requesting return phone call.    CAMI Dasilva, TALAT, Social Work/Case Management   [phone]  Electronically signed by CAMI Dasilva LSW on 11/8/2021 at 2:12 PM

## 2021-11-08 NOTE — PLAN OF CARE
Problem: Airway Clearance - Ineffective  Goal: Achieve or maintain patent airway  11/8/2021 0644 by Angie Fair RN  Outcome: Ongoing  11/7/2021 1735 by Chelsie Palm RN  Outcome: Ongoing     Problem: Gas Exchange - Impaired  Goal: Absence of hypoxia  11/8/2021 0644 by Angie Fair RN  Outcome: Ongoing  11/7/2021 1735 by Chelsie Palm RN  Outcome: Ongoing  Goal: Promote optimal lung function  11/8/2021 0644 by Angie Fair RN  Outcome: Ongoing  11/7/2021 1735 by Chelsie Palm RN  Outcome: Ongoing     Problem: Breathing Pattern - Ineffective  Goal: Ability to achieve and maintain a regular respiratory rate  11/8/2021 0644 by Angie Fair RN  Outcome: Ongoing  11/7/2021 1735 by Chelsie Palm RN  Outcome: Ongoing     Problem:  Body Temperature -  Risk of, Imbalanced  Goal: Ability to maintain a body temperature within defined limits  11/8/2021 0644 by Angie Fair RN  Outcome: Ongoing  11/7/2021 1735 by Chelsie Palm RN  Outcome: Ongoing  Goal: Will regain or maintain usual level of consciousness  11/8/2021 0644 by Angie Fair RN  Outcome: Ongoing  11/7/2021 1735 by Chelsie Palm RN  Outcome: Ongoing  Goal: Complications related to the disease process, condition or treatment will be avoided or minimized  11/8/2021 0644 by Angie Fair RN  Outcome: Ongoing  11/7/2021 1735 by Chelsie Palm RN  Outcome: Ongoing     Problem: Isolation Precautions - Risk of Spread of Infection  Goal: Prevent transmission of infection  11/8/2021 0644 by Angie Fair RN  Outcome: Ongoing  11/7/2021 1735 by Chelsie Palm RN  Outcome: Ongoing     Problem: Nutrition Deficits  Goal: Optimize nutritional status  11/8/2021 0644 by Angie Fair RN  Outcome: Ongoing  11/7/2021 1735 by Chelsie Palm RN  Outcome: Ongoing     Problem: Risk for Fluid Volume Deficit  Goal: Maintain normal heart rhythm  11/8/2021 0644 by Angie Fair RN  Outcome: Ongoing  11/7/2021 1735 by Chelsie Palm RN  Outcome: Ongoing  Goal: Maintain absence of muscle cramping  11/8/2021 0644 by Jesse Zimmerman RN  Outcome: Ongoing  11/7/2021 1735 by Juan Ramon Emerson RN  Outcome: Ongoing  Goal: Maintain normal serum potassium, sodium, calcium, phosphorus, and pH  11/8/2021 0644 by Jesse Zimmerman RN  Outcome: Ongoing  11/7/2021 1735 by Juan Ramon Emerson RN  Outcome: Ongoing     Problem: Loneliness or Risk for Loneliness  Goal: Demonstrate positive use of time alone when socialization is not possible  11/8/2021 0644 by Jesse Zimmerman RN  Outcome: Ongoing  11/7/2021 1735 by Juan Ramon Emerson RN  Outcome: Ongoing     Problem: Fatigue  Goal: Verbalize increase energy and improved vitality  11/8/2021 0644 by Jesse Zimmerman RN  Outcome: Ongoing  11/7/2021 1735 by Juan Ramon Emerson RN  Outcome: Ongoing     Problem: Patient Education: Go to Patient Education Activity  Goal: Patient/Family Education  11/8/2021 9984 by Jesse Zimmerman RN  Outcome: Ongoing  11/7/2021 1735 by Juan Ramon Emerson RN  Outcome: Ongoing     Problem: Infection:  Goal: Will remain free from infection  Description: Will remain free from infection  11/8/2021 0644 by Jesse Zmimerman RN  Outcome: Ongoing  11/7/2021 1735 by Juan Ramon Emerson RN  Outcome: Ongoing     Problem: Safety:  Goal: Free from accidental physical injury  Description: Free from accidental physical injury  11/8/2021 0644 by Jesse Zimmerman RN  Outcome: Ongoing  11/7/2021 1735 by Juan Ramon Emerson RN  Outcome: Ongoing  Goal: Free from intentional harm  Description: Free from intentional harm  11/8/2021 0644 by Jesse Zimmerman RN  Outcome: Ongoing  11/7/2021 1735 by Juan Ramon Emerson RN  Outcome: Ongoing     Problem: Daily Care:  Goal: Daily care needs are met  Description: Daily care needs are met  11/8/2021 0644 by Jesse Zimmerman RN  Outcome: Ongoing  11/7/2021 1735 by Juan Ramon Emerson RN  Outcome: Ongoing     Problem: Pain:  Goal: Patient's pain/discomfort is manageable  Description: Patient's pain/discomfort is manageable  11/8/2021 0644 by Lb Franco RN  Outcome: Ongoing  11/7/2021 1735 by Tania Luo RN  Outcome: Ongoing     Problem: Skin Integrity:  Goal: Skin integrity will stabilize  Description: Skin integrity will stabilize  11/8/2021 0644 by Lb Franco RN  Outcome: Ongoing  11/7/2021 1735 by Tania Luo RN  Outcome: Ongoing     Problem: Discharge Planning:  Goal: Patients continuum of care needs are met  Description: Patients continuum of care needs are met  11/8/2021 0644 by Lb Franco RN  Outcome: Ongoing  11/7/2021 1735 by Tania Luo RN  Outcome: Ongoing     Problem: COMMUNICATION IMPAIRMENT  Goal: Ability to express needs and understand communication  11/8/2021 0644 by Lb Franco RN  Outcome: Ongoing  11/7/2021 1735 by Tania Luo RN  Outcome: Ongoing

## 2021-11-08 NOTE — PLAN OF CARE
Problem: Airway Clearance - Ineffective  Goal: Achieve or maintain patent airway  11/8/2021 1017 by Grecia Clark RN  Outcome: Ongoing  11/8/2021 0644 by Janiya De La Rosa RN  Outcome: Ongoing     Problem: Gas Exchange - Impaired  Goal: Absence of hypoxia  11/8/2021 1017 by Grecia Clark RN  Outcome: Ongoing  11/8/2021 0644 by Jnaiya De La Rosa RN  Outcome: Ongoing  Goal: Promote optimal lung function  11/8/2021 1017 by Grecia Clark RN  Outcome: Ongoing  11/8/2021 0644 by Janiya De La Rosa RN  Outcome: Ongoing     Problem: Breathing Pattern - Ineffective  Goal: Ability to achieve and maintain a regular respiratory rate  11/8/2021 1017 by Grecia Clark RN  Outcome: Ongoing  11/8/2021 0644 by Janiya De La Rosa RN  Outcome: Ongoing     Problem:  Body Temperature -  Risk of, Imbalanced  Goal: Ability to maintain a body temperature within defined limits  11/8/2021 1017 by Grecia Clark RN  Outcome: Ongoing  11/8/2021 0644 by Janiya De La Rosa RN  Outcome: Ongoing  Goal: Will regain or maintain usual level of consciousness  11/8/2021 1017 by Grecia Clark RN  Outcome: Ongoing  11/8/2021 0644 by Janiya De La Rosa RN  Outcome: Ongoing  Goal: Complications related to the disease process, condition or treatment will be avoided or minimized  11/8/2021 1017 by Grecia Clark RN  Outcome: Ongoing  11/8/2021 0644 by Janiya De La Rosa RN  Outcome: Ongoing     Problem: Isolation Precautions - Risk of Spread of Infection  Goal: Prevent transmission of infection  11/8/2021 1017 by Grecia Clark RN  Outcome: Ongoing  11/8/2021 0644 by Janiya De La Rosa RN  Outcome: Ongoing     Problem: Nutrition Deficits  Goal: Optimize nutritional status  11/8/2021 1017 by Grecia Clark RN  Outcome: Ongoing  11/8/2021 0644 by Janiya De La Rosa RN  Outcome: Ongoing     Problem: Risk for Fluid Volume Deficit  Goal: Maintain normal heart rhythm  11/8/2021 1017 by Grecia Clark RN  Outcome: Ongoing  11/8/2021 0644 by Jesse Zimmerman RN  Outcome: Ongoing  Goal: Maintain absence of muscle cramping  11/8/2021 1017 by Fabio Londono RN  Outcome: Ongoing  11/8/2021 0644 by Jesse Zimmerman RN  Outcome: Ongoing  Goal: Maintain normal serum potassium, sodium, calcium, phosphorus, and pH  11/8/2021 1017 by Fabio Londono RN  Outcome: Ongoing  11/8/2021 0644 by Jesse Zimmerman RN  Outcome: Ongoing     Problem: Loneliness or Risk for Loneliness  Goal: Demonstrate positive use of time alone when socialization is not possible  11/8/2021 1017 by Fabio Londono RN  Outcome: Ongoing  11/8/2021 0644 by Jesse Zimmerman RN  Outcome: Ongoing     Problem: Fatigue  Goal: Verbalize increase energy and improved vitality  11/8/2021 1017 by Fabio Londono RN  Outcome: Ongoing  11/8/2021 0644 by Jesse Zimmerman RN  Outcome: Ongoing     Problem: Patient Education: Go to Patient Education Activity  Goal: Patient/Family Education  11/8/2021 1017 by Fabio Londono RN  Outcome: Ongoing  11/8/2021 0644 by Jesse Zimmerman RN  Outcome: Ongoing     Problem: Infection:  Goal: Will remain free from infection  Description: Will remain free from infection  11/8/2021 1017 by Fabio Londono RN  Outcome: Ongoing  11/8/2021 0644 by Jesse Zimmerman RN  Outcome: Ongoing     Problem: Safety:  Goal: Free from accidental physical injury  Description: Free from accidental physical injury  11/8/2021 1017 by Fabio Londono RN  Outcome: Ongoing  11/8/2021 0644 by Jesse Zimmerman RN  Outcome: Ongoing  Goal: Free from intentional harm  Description: Free from intentional harm  11/8/2021 1017 by Fabio Londono RN  Outcome: Ongoing  11/8/2021 0644 by Jesse Zimmerman RN  Outcome: Ongoing     Problem: Daily Care:  Goal: Daily care needs are met  Description: Daily care needs are met  11/8/2021 1017 by Fabio Londono RN  Outcome: Ongoing  11/8/2021 0644 by Jesse Zimmerman RN  Outcome: Ongoing     Problem: Pain:  Goal: Patient's pain/discomfort is manageable  Description: Patient's pain/discomfort is manageable  11/8/2021 1017 by García Benavidez RN  Outcome: Ongoing  11/8/2021 0644 by Shelly Beasley RN  Outcome: Ongoing     Problem: Skin Integrity:  Goal: Skin integrity will stabilize  Description: Skin integrity will stabilize  11/8/2021 1017 by García Benavidez RN  Outcome: Ongoing  11/8/2021 0644 by Shelly Beasley RN  Outcome: Ongoing     Problem: Discharge Planning:  Goal: Patients continuum of care needs are met  Description: Patients continuum of care needs are met  11/8/2021 1017 by García Benavidez RN  Outcome: Ongoing  11/8/2021 0644 by Shelly Beasley RN  Outcome: Ongoing     Problem: COMMUNICATION IMPAIRMENT  Goal: Ability to express needs and understand communication  11/8/2021 1017 by García Benavidez RN  Outcome: Ongoing  11/8/2021 0644 by Shelly Beasley RN  Outcome: Ongoing

## 2021-11-08 NOTE — PROGRESS NOTES
Occupational Therapy Attempt/Discharge  Liza Vaughan    OT orders noted. Per PT and RN, pt is independent w/ no therapy concerns and to d/c today. Will cancel OT orders.     Electronically signed by Nidia Mohs, OT on 11/8/21 at 1:46 PM EST

## 2021-11-09 ENCOUNTER — CARE COORDINATION (OUTPATIENT)
Dept: CASE MANAGEMENT | Age: 26
End: 2021-11-09

## 2021-11-09 NOTE — CARE COORDINATION
Patient contacted regarding COVID-19 diagnosis. Discussed COVID-19 related testing which was available at this time. Test results were positive. Patient informed of results, if available? Yes. Care Transition Nurse contacted the patient by telephone to perform post discharge assessment. Call within 2 business days of discharge: Yes. Verified name and  with patient as identifiers. Provided introduction to self, and explanation of the CTN/ACM role, and reason for call due to risk factors for infection and/or exposure to COVID-19. Symptoms reviewed with patient who verbalized the following symptoms: no new symptoms and no worsening symptoms. Due to no new or worsening symptoms encounter was not routed to provider for escalation. Discussed follow-up appointments. If no appointment was previously scheduled, appointment scheduling offered: Yes. [facility] follow up appointment(s): No future appointments. Non-face-to-face services provided:  Obtained and reviewed discharge summary and/or continuity of care documents  Education of patient/family/caregiver/guardian to support self-management-   Assessment and support for treatment adherence and medication management-     Educated patient about risk for severe COVID-19 due to risk factors according to CDC guidelines. CTN reviewed discharge instructions, medical action plan and red flag symptoms with the patient who verbalized understanding. Discussed COVID vaccination status: No. Education provided on COVID-19 vaccination as appropriate. Discussed exposure protocols and quarantine with CDC Guidelines. Patient was given an opportunity to verbalize any questions and concerns and agrees to contact CTN or health care provider for questions related to their healthcare. Reviewed and educated patient on any new and changed medications related to discharge diagnosis     Was patient discharged with a pulse oximeter? No       CTN provided contact information.  Plan for follow-up call in 5-7 days based on severity of symptoms and risk factors. Pt states she's doing fine. States she had a little SOB yesterday going up the stairs which resolved with rest and inhaler. States she continues to have productive cough with yellowish sputum. Denies lightheadedness, dizziness, CP, fever, or other flu-like symptoms. Meds reviewed and she reports taking as prescribed. Pt had questions regarding side affects of metformin as her dad described an anaphylactic reaction. Discussed potential side effects and explained anaphylactic reactions related to allergies. Pt denies any side effects a this time. Agrees to take medication as prescribed. Denies other questions or concerns at this time. She plans to schedule her f/u appt today.

## 2021-11-18 ENCOUNTER — CARE COORDINATION (OUTPATIENT)
Dept: CASE MANAGEMENT | Age: 26
End: 2021-11-18

## 2021-11-18 NOTE — CARE COORDINATION
[facility] Transitions Follow Up Call    2021    Patient: Cole Lynne  Patient : 1995   MRN: <[id number]>  Reason for Admission: Covid 19/PNA/Asthma/DM2  Discharge Date: 21 RARS: Readmission Risk Score: 10 ( )    Spoke with: Cole Lynne who stated that she is doing all right. Patient reports that she is still having a little sob but it getting better. Patient reports that she is taking all medications as ordered. Writer asked if she scheduled her HFU patient replied no. Patient declined assistance and stated she will do it herself. Writer advised on the importance of the follow up especially with the new Dx of asthma and DM and the call was dropped. Writer attempted to reach patient again and call went to voice mail. Writer left a HIPPA approved message with call back contact information. Note routed to PCP to reach out for a follow up. Care Transitions Subsequent and Final Call    Subsequent and Final Calls  Care Transitions Interventions  Other Interventions: Follow Up  No future appointments.     Alexx Osuna LPN

## 2021-11-19 ENCOUNTER — TELEPHONE (OUTPATIENT)
Dept: PHARMACY | Age: 26
End: 2021-11-19

## 2021-11-19 NOTE — TELEPHONE ENCOUNTER
New Diabetes referral from the hospitalist. Recommended from the Diabetes Educator. No answer. Left message.      Ivon García, PharmD  [facility]  Diabetes Service  [phone]

## 2021-11-30 ENCOUNTER — CARE COORDINATION (OUTPATIENT)
Dept: CASE MANAGEMENT | Age: 26
End: 2021-11-30

## 2021-11-30 NOTE — CARE COORDINATION
[facility] Transitions Follow Up Call    2021    Patient: Colleen Reyna  Patient : 1995   MRN: [id number]  Reason for Admission: Covid 19  Discharge Date: 21 RARS: Readmission Risk Score: 10 ( )           Attempted to contact patient for Covid 19 follow up transition call. Left HIPAA compliant voicemail message to return call with an update on condition since discharge. Contact information provided. Will continue to follow up. Care Transitions Subsequent and Final Call    Subsequent and Final Calls  Care Transitions Interventions  Other Interventions: Follow Up  No future appointments.     Latisha Gallegos LPN

## 2021-12-01 ENCOUNTER — TELEPHONE (OUTPATIENT)
Dept: PHARMACY | Age: 26
End: 2021-12-01

## 2021-12-07 ENCOUNTER — CARE COORDINATION (OUTPATIENT)
Dept: CASE MANAGEMENT | Age: 26
End: 2021-12-07

## 2021-12-07 NOTE — CARE COORDINATION
Care Transitions Outreach Attempt    Call within 2 business days of discharge: Yes   Attempted to reach patient for transitions of care follow up. Unable to reach patient. Left HIPPA Compliant VM. Martha Nettles LPN, [facility]: [phone]      Patient: Bibi Jackson Patient : 1995 MRN: <[id number]>    Last Discharge [facility]       Complaint Diagnosis Description Type Department Provider    21 Shortness of Breath Viral sepsis ([facility] [address] . .. ED to Hosp-Admission (Discharged) (ADMITTED) Lieutenant Marielos MD; Cachorro Banks. .. Was this an external facility discharge? No Discharge Facility:     Noted following upcoming appointments from discharge chart review:   [facility] follow up appointment(s): No future appointments.   [facility] follow up appointment(s):

## 2021-12-09 ENCOUNTER — TELEPHONE (OUTPATIENT)
Dept: PHARMACY | Age: 26
End: 2021-12-09

## 2021-12-09 NOTE — TELEPHONE ENCOUNTER
3rd attempt. New Diabetes referral from the hospitalist. Recommended from the Diabetes Educator. No answer. Left message. I will go ahead and close her referral.  Real King is welcome at anytime.      Nicolas Alpers, PharmD  [address] [place] [address]  Diabetes Service  [phone]